# Patient Record
Sex: MALE | Race: NATIVE HAWAIIAN OR OTHER PACIFIC ISLANDER | NOT HISPANIC OR LATINO | Employment: FULL TIME | URBAN - METROPOLITAN AREA
[De-identification: names, ages, dates, MRNs, and addresses within clinical notes are randomized per-mention and may not be internally consistent; named-entity substitution may affect disease eponyms.]

---

## 2018-11-06 ENCOUNTER — OFFICE VISIT (OUTPATIENT)
Dept: URGENT CARE | Facility: PHYSICIAN GROUP | Age: 43
End: 2018-11-06
Payer: COMMERCIAL

## 2018-11-06 VITALS
HEIGHT: 67 IN | TEMPERATURE: 98.6 F | DIASTOLIC BLOOD PRESSURE: 76 MMHG | OXYGEN SATURATION: 98 % | RESPIRATION RATE: 18 BRPM | SYSTOLIC BLOOD PRESSURE: 128 MMHG | WEIGHT: 199 LBS | BODY MASS INDEX: 31.23 KG/M2 | HEART RATE: 74 BPM

## 2018-11-06 DIAGNOSIS — R42 DIZZINESS: ICD-10-CM

## 2018-11-06 DIAGNOSIS — M79.10 MYALGIA: ICD-10-CM

## 2018-11-06 DIAGNOSIS — J02.0 STREP THROAT: Primary | ICD-10-CM

## 2018-11-06 LAB
FLUAV+FLUBV AG SPEC QL IA: NORMAL
INT CON NEG: NEGATIVE
INT CON NEG: NEGATIVE
INT CON POS: POSITIVE
INT CON POS: POSITIVE
S PYO AG THROAT QL: NORMAL

## 2018-11-06 PROCEDURE — 87804 INFLUENZA ASSAY W/OPTIC: CPT | Performed by: PHYSICIAN ASSISTANT

## 2018-11-06 PROCEDURE — 87880 STREP A ASSAY W/OPTIC: CPT | Performed by: PHYSICIAN ASSISTANT

## 2018-11-06 PROCEDURE — 99204 OFFICE O/P NEW MOD 45 MIN: CPT | Performed by: PHYSICIAN ASSISTANT

## 2018-11-06 RX ORDER — ASPIRIN 81 MG/1
81 TABLET, CHEWABLE ORAL DAILY
COMMUNITY

## 2018-11-06 RX ORDER — AMOXICILLIN 875 MG/1
875 TABLET, COATED ORAL 2 TIMES DAILY
Qty: 20 TAB | Refills: 0 | Status: SHIPPED | OUTPATIENT
Start: 2018-11-06

## 2018-11-06 NOTE — LETTER
November 6, 2018         Patient: Abdulaziz Shaffer   YOB: 1975   Date of Visit: 11/6/2018           To Whom it May Concern:    Abdulaziz Shaffer was seen in my clinic on 11/6/2018. He may return to work on 11/08/2018.     If you have any questions or concerns, please don't hesitate to call.        Sincerely,           Tamara Carrero P.A.-C.  Electronically Signed

## 2018-11-07 NOTE — PATIENT INSTRUCTIONS
Strep Throat  Strep throat is a bacterial infection of the throat. Your health care provider may call the infection tonsillitis or pharyngitis, depending on whether there is swelling in the tonsils or at the back of the throat. Strep throat is most common during the cold months of the year in children who are 5-15 years of age, but it can happen during any season in people of any age. This infection is spread from person to person (contagious) through coughing, sneezing, or close contact.  What are the causes?  Strep throat is caused by the bacteria called Streptococcus pyogenes.  What increases the risk?  This condition is more likely to develop in:  · People who spend time in crowded places where the infection can spread easily.  · People who have close contact with someone who has strep throat.  What are the signs or symptoms?  Symptoms of this condition include:  · Fever or chills.  · Redness, swelling, or pain in the tonsils or throat.  · Pain or difficulty when swallowing.  · White or yellow spots on the tonsils or throat.  · Swollen, tender glands in the neck or under the jaw.  · Red rash all over the body (rare).  How is this diagnosed?  This condition is diagnosed by performing a rapid strep test or by taking a swab of your throat (throat culture test). Results from a rapid strep test are usually ready in a few minutes, but throat culture test results are available after one or two days.  How is this treated?  This condition is treated with antibiotic medicine.  Follow these instructions at home:  Medicines  · Take over-the-counter and prescription medicines only as told by your health care provider.  · Take your antibiotic as told by your health care provider. Do not stop taking the antibiotic even if you start to feel better.  · Have family members who also have a sore throat or fever tested for strep throat. They may need antibiotics if they have the strep infection.  Eating and drinking  · Do not share  food, drinking cups, or personal items that could cause the infection to spread to other people.  · If swallowing is difficult, try eating soft foods until your sore throat feels better.  · Drink enough fluid to keep your urine clear or pale yellow.  General instructions  · Gargle with a salt-water mixture 3-4 times per day or as needed. To make a salt-water mixture, completely dissolve ½-1 tsp of salt in 1 cup of warm water.  · Make sure that all household members wash their hands well.  · Get plenty of rest.  · Stay home from school or work until you have been taking antibiotics for 24 hours.  · Keep all follow-up visits as told by your health care provider. This is important.  Contact a health care provider if:  · The glands in your neck continue to get bigger.  · You develop a rash, cough, or earache.  · You cough up a thick liquid that is green, yellow-brown, or bloody.  · You have pain or discomfort that does not get better with medicine.  · Your problems seem to be getting worse rather than better.  · You have a fever.  Get help right away if:  · You have new symptoms, such as vomiting, severe headache, stiff or painful neck, chest pain, or shortness of breath.  · You have severe throat pain, drooling, or changes in your voice.  · You have swelling of the neck, or the skin on the neck becomes red and tender.  · You have signs of dehydration, such as fatigue, dry mouth, and decreased urination.  · You become increasingly sleepy, or you cannot wake up completely.  · Your joints become red or painful.  This information is not intended to replace advice given to you by your health care provider. Make sure you discuss any questions you have with your health care provider.  Document Released: 12/15/2001 Document Revised: 08/16/2017 Document Reviewed: 04/11/2016  [employer] Patient Education © 2017 [employer]

## 2018-11-07 NOTE — PROGRESS NOTES
"Subjective:      Abdulaziz Shaffer is a 43 y.o. male who presents with Dizziness (arms and legs felt weak X 4 pm today )    PMH: Reviewed with patient/family member/EPIC.   MEDS:   Current Outpatient Prescriptions:   •  aspirin (ASA) 81 MG Chew Tab chewable tablet, Take 81 mg by mouth every day., Disp: , Rfl:   •  amoxicillin (AMOXIL) 875 MG tablet, Take 1 Tab by mouth 2 times a day., Disp: 20 Tab, Rfl: 0  ALLERGIES: No Known Allergies  SURGHX: No past surgical history on file.  SOCHX:  reports that he has never smoked. He has never used smokeless tobacco.  FH: Reviewed with patient/family. Not pertinent to this complaint.            Patient presents with:  Fatigue and Dizziness: arms and legs felt weak heavy X 4 pm today , fever, chills, and sore throat, ha since yesterday.  Pt has not taken any otc medications for symptoms..           Pharyngitis    This is a new problem. The current episode started yesterday. The problem has been gradually worsening. Neither side of throat is experiencing more pain than the other. The maximum temperature recorded prior to his arrival was 100.4 - 100.9 F. The fever has been present for 1 to 2 days. The pain is at a severity of 5/10. Associated symptoms include headaches and swollen glands. Pertinent negatives include no abdominal pain, coughing, neck pain or trouble swallowing. He has tried nothing for the symptoms. The treatment provided no relief.       Review of Systems   Constitutional: Positive for chills, fever and malaise/fatigue.   HENT: Positive for sore throat. Negative for trouble swallowing.    Respiratory: Negative for cough.    Gastrointestinal: Negative for abdominal pain.   Musculoskeletal: Positive for myalgias. Negative for neck pain.   Neurological: Positive for dizziness and headaches.   All other systems reviewed and are negative.         Objective:     /76   Pulse 74   Temp 37 °C (98.6 °F)   Resp 18   Ht 1.702 m (5' 7\")   Wt 90.3 kg (199 lb)   SpO2 " 98%   BMI 31.17 kg/m²      Physical Exam   Constitutional: He is oriented to person, place, and time. He appears well-developed and well-nourished. No distress.   HENT:   Head: Normocephalic.   Right Ear: Tympanic membrane normal.   Left Ear: Tympanic membrane normal.   Nose: Nose normal.   Mouth/Throat: Uvula is midline and mucous membranes are normal. Posterior oropharyngeal edema and posterior oropharyngeal erythema present. No tonsillar abscesses. Tonsils are 3+ on the right. Tonsils are 3+ on the left. No tonsillar exudate.   Eyes: Pupils are equal, round, and reactive to light. Conjunctivae and EOM are normal.   Neck: Normal range of motion. Neck supple.   Cardiovascular: Normal rate, regular rhythm and normal heart sounds.    Pulmonary/Chest: Effort normal and breath sounds normal.   Abdominal: Soft.   Musculoskeletal: Normal range of motion.   Lymphadenopathy:     He has cervical adenopathy.   Neurological: He is alert and oriented to person, place, and time.   Skin: Skin is warm and dry. Capillary refill takes less than 2 seconds.   Psychiatric: He has a normal mood and affect.   Nursing note and vitals reviewed.         Strep:positive     Assessment/Plan:     1. Strep throat  amoxicillin (AMOXIL) 875 MG tablet   2. Dizziness  POCT Rapid Strep A    POCT Influenza A/B    amoxicillin (AMOXIL) 875 MG tablet   3. Myalgia  POCT Rapid Strep A    POCT Influenza A/B    amoxicillin (AMOXIL) 875 MG tablet     Motrin/Advil/Ibuprophen 600 mg every 6 hours as needed for pain or fever.    PT advised saltwater gargles/swishes  3-4 times daily until symptoms improve.     PT should follow up with PCP in 1-2 days for re-evaluation if symptoms have not improved.  Discussed red flags and reasons to return to UC or ED.  Pt and/or family verbalized understanding of diagnosis and follow up instructions and was offered informational handout on diagnosis.  PT discharged.

## 2018-11-10 ASSESSMENT — ENCOUNTER SYMPTOMS
DIZZINESS: 1
SWOLLEN GLANDS: 1
CHILLS: 1
HEADACHES: 1
NECK PAIN: 0
ABDOMINAL PAIN: 0
TROUBLE SWALLOWING: 0
MYALGIAS: 1
COUGH: 0
FEVER: 1
SORE THROAT: 1

## 2019-05-25 ENCOUNTER — HOSPITAL ENCOUNTER (OUTPATIENT)
Dept: LAB | Facility: MEDICAL CENTER | Age: 44
End: 2019-05-25
Attending: FAMILY MEDICINE
Payer: COMMERCIAL

## 2019-05-25 LAB
ALBUMIN SERPL BCP-MCNC: 4.4 G/DL (ref 3.2–4.9)
ALBUMIN/GLOB SERPL: 1.5 G/DL
ALP SERPL-CCNC: 53 U/L (ref 30–99)
ALT SERPL-CCNC: 33 U/L (ref 2–50)
ANION GAP SERPL CALC-SCNC: 7 MMOL/L (ref 0–11.9)
AST SERPL-CCNC: 27 U/L (ref 12–45)
BASOPHILS # BLD AUTO: 0.7 % (ref 0–1.8)
BASOPHILS # BLD: 0.04 K/UL (ref 0–0.12)
BILIRUB SERPL-MCNC: 0.6 MG/DL (ref 0.1–1.5)
BUN SERPL-MCNC: 12 MG/DL (ref 8–22)
CALCIUM SERPL-MCNC: 9.1 MG/DL (ref 8.5–10.5)
CHLORIDE SERPL-SCNC: 104 MMOL/L (ref 96–112)
CHOLEST SERPL-MCNC: 212 MG/DL (ref 100–199)
CO2 SERPL-SCNC: 25 MMOL/L (ref 20–33)
CREAT SERPL-MCNC: 0.77 MG/DL (ref 0.5–1.4)
EOSINOPHIL # BLD AUTO: 0.11 K/UL (ref 0–0.51)
EOSINOPHIL NFR BLD: 1.9 % (ref 0–6.9)
ERYTHROCYTE [DISTWIDTH] IN BLOOD BY AUTOMATED COUNT: 39.1 FL (ref 35.9–50)
FASTING STATUS PATIENT QL REPORTED: NORMAL
GLOBULIN SER CALC-MCNC: 2.9 G/DL (ref 1.9–3.5)
GLUCOSE SERPL-MCNC: 98 MG/DL (ref 65–99)
HCT VFR BLD AUTO: 48.2 % (ref 42–52)
HDLC SERPL-MCNC: 46 MG/DL
HGB BLD-MCNC: 16 G/DL (ref 14–18)
IMM GRANULOCYTES # BLD AUTO: 0.01 K/UL (ref 0–0.11)
IMM GRANULOCYTES NFR BLD AUTO: 0.2 % (ref 0–0.9)
LDLC SERPL CALC-MCNC: 151 MG/DL
LYMPHOCYTES # BLD AUTO: 1.6 K/UL (ref 1–4.8)
LYMPHOCYTES NFR BLD: 27.9 % (ref 22–41)
MCH RBC QN AUTO: 27.4 PG (ref 27–33)
MCHC RBC AUTO-ENTMCNC: 33.2 G/DL (ref 33.7–35.3)
MCV RBC AUTO: 82.5 FL (ref 81.4–97.8)
MONOCYTES # BLD AUTO: 0.48 K/UL (ref 0–0.85)
MONOCYTES NFR BLD AUTO: 8.4 % (ref 0–13.4)
NEUTROPHILS # BLD AUTO: 3.49 K/UL (ref 1.82–7.42)
NEUTROPHILS NFR BLD: 60.9 % (ref 44–72)
NRBC # BLD AUTO: 0 K/UL
NRBC BLD-RTO: 0 /100 WBC
PLATELET # BLD AUTO: 322 K/UL (ref 164–446)
PMV BLD AUTO: 9.8 FL (ref 9–12.9)
POTASSIUM SERPL-SCNC: 4.1 MMOL/L (ref 3.6–5.5)
PROT SERPL-MCNC: 7.3 G/DL (ref 6–8.2)
RBC # BLD AUTO: 5.84 M/UL (ref 4.7–6.1)
SODIUM SERPL-SCNC: 136 MMOL/L (ref 135–145)
TRIGL SERPL-MCNC: 76 MG/DL (ref 0–149)
WBC # BLD AUTO: 5.7 K/UL (ref 4.8–10.8)

## 2019-05-25 PROCEDURE — 80053 COMPREHEN METABOLIC PANEL: CPT

## 2019-05-25 PROCEDURE — 80061 LIPID PANEL: CPT

## 2019-05-25 PROCEDURE — 85025 COMPLETE CBC W/AUTO DIFF WBC: CPT

## 2019-05-25 PROCEDURE — 36415 COLL VENOUS BLD VENIPUNCTURE: CPT

## 2019-08-31 ENCOUNTER — HOSPITAL ENCOUNTER (OUTPATIENT)
Dept: LAB | Facility: MEDICAL CENTER | Age: 44
End: 2019-08-31
Attending: FAMILY MEDICINE
Payer: COMMERCIAL

## 2019-08-31 LAB
ALBUMIN SERPL BCP-MCNC: 4.5 G/DL (ref 3.2–4.9)
ALBUMIN/GLOB SERPL: 1.6 G/DL
ALP SERPL-CCNC: 55 U/L (ref 30–99)
ALT SERPL-CCNC: 30 U/L (ref 2–50)
ANION GAP SERPL CALC-SCNC: 7 MMOL/L (ref 0–11.9)
AST SERPL-CCNC: 20 U/L (ref 12–45)
BILIRUB SERPL-MCNC: 0.6 MG/DL (ref 0.1–1.5)
BUN SERPL-MCNC: 11 MG/DL (ref 8–22)
CALCIUM SERPL-MCNC: 9.4 MG/DL (ref 8.5–10.5)
CHLORIDE SERPL-SCNC: 103 MMOL/L (ref 96–112)
CHOLEST SERPL-MCNC: 186 MG/DL (ref 100–199)
CO2 SERPL-SCNC: 27 MMOL/L (ref 20–33)
CREAT SERPL-MCNC: 0.88 MG/DL (ref 0.5–1.4)
FASTING STATUS PATIENT QL REPORTED: NORMAL
GLOBULIN SER CALC-MCNC: 2.9 G/DL (ref 1.9–3.5)
GLUCOSE SERPL-MCNC: 99 MG/DL (ref 65–99)
HDLC SERPL-MCNC: 42 MG/DL
LDLC SERPL CALC-MCNC: 117 MG/DL
POTASSIUM SERPL-SCNC: 4 MMOL/L (ref 3.6–5.5)
PROT SERPL-MCNC: 7.4 G/DL (ref 6–8.2)
SODIUM SERPL-SCNC: 137 MMOL/L (ref 135–145)
TRIGL SERPL-MCNC: 137 MG/DL (ref 0–149)

## 2019-08-31 PROCEDURE — 36415 COLL VENOUS BLD VENIPUNCTURE: CPT

## 2019-08-31 PROCEDURE — 80053 COMPREHEN METABOLIC PANEL: CPT

## 2019-08-31 PROCEDURE — 80061 LIPID PANEL: CPT

## 2019-10-21 ENCOUNTER — OFFICE VISIT (OUTPATIENT)
Dept: URGENT CARE | Facility: PHYSICIAN GROUP | Age: 44
End: 2019-10-21
Payer: COMMERCIAL

## 2019-10-21 VITALS
BODY MASS INDEX: 31.39 KG/M2 | TEMPERATURE: 97.2 F | OXYGEN SATURATION: 97 % | SYSTOLIC BLOOD PRESSURE: 128 MMHG | DIASTOLIC BLOOD PRESSURE: 72 MMHG | RESPIRATION RATE: 16 BRPM | HEIGHT: 67 IN | WEIGHT: 200 LBS | HEART RATE: 106 BPM

## 2019-10-21 DIAGNOSIS — R68.83 CHILLS (WITHOUT FEVER): ICD-10-CM

## 2019-10-21 DIAGNOSIS — M79.10 MYALGIA: ICD-10-CM

## 2019-10-21 DIAGNOSIS — R42 DIZZINESS: ICD-10-CM

## 2019-10-21 LAB
APPEARANCE UR: NORMAL
BILIRUB UR STRIP-MCNC: NEGATIVE MG/DL
COLOR UR AUTO: NORMAL
FLUAV+FLUBV AG SPEC QL IA: NEGATIVE
GLUCOSE UR STRIP.AUTO-MCNC: NEGATIVE MG/DL
INT CON NEG: NEGATIVE
INT CON NEG: NEGATIVE
INT CON POS: POSITIVE
INT CON POS: POSITIVE
KETONES UR STRIP.AUTO-MCNC: NEGATIVE MG/DL
LEUKOCYTE ESTERASE UR QL STRIP.AUTO: NEGATIVE
NITRITE UR QL STRIP.AUTO: NEGATIVE
PH UR STRIP.AUTO: 5.5 [PH] (ref 5–8)
PROT UR QL STRIP: NEGATIVE MG/DL
RBC UR QL AUTO: NEGATIVE
S PYO AG THROAT QL: NEGATIVE
SP GR UR STRIP.AUTO: 1.03
UROBILINOGEN UR STRIP-MCNC: 0.2 MG/DL

## 2019-10-21 PROCEDURE — 81002 URINALYSIS NONAUTO W/O SCOPE: CPT | Performed by: NURSE PRACTITIONER

## 2019-10-21 PROCEDURE — 87804 INFLUENZA ASSAY W/OPTIC: CPT | Performed by: NURSE PRACTITIONER

## 2019-10-21 PROCEDURE — 87880 STREP A ASSAY W/OPTIC: CPT | Performed by: NURSE PRACTITIONER

## 2019-10-21 PROCEDURE — 99214 OFFICE O/P EST MOD 30 MIN: CPT | Performed by: NURSE PRACTITIONER

## 2019-10-21 ASSESSMENT — ENCOUNTER SYMPTOMS
DIARRHEA: 0
NAUSEA: 1
NECK PAIN: 0
DIZZINESS: 1
CHILLS: 0
SORE THROAT: 0
VOMITING: 0
SHORTNESS OF BREATH: 0
BACK PAIN: 0
SPUTUM PRODUCTION: 0
SINUS PAIN: 0
HEARTBURN: 0
WHEEZING: 0
COUGH: 0
FEVER: 0
HEADACHES: 0

## 2019-10-21 NOTE — PROGRESS NOTES
Subjective:      Abdulaziz Shaffer is a 44 y.o. male who presents with Dizziness (weakness, body aches, X today )            HPI   This is a new problem.  Patient states that after he ate his lunch today he started feeling nauseated, dizzy, achy and weak in his upper body as well as having muscle pain in his upper shoulders that came on suddenly and is constant.  Patient states that the last time he felt this way he tested positive for strep throat.  Patient admits to chills and subjective fever, but denies nasal congestion, runny nose, cough, sore throat, diarrhea, heartburn, or headache.  Patient is not taking anything over-the-counter for symptoms.    Review of Systems   Constitutional: Negative for chills and fever.   HENT: Negative for congestion, ear pain, sinus pain and sore throat.    Respiratory: Negative for cough, sputum production, shortness of breath and wheezing.    Cardiovascular: Negative for chest pain.   Gastrointestinal: Positive for nausea. Negative for diarrhea, heartburn and vomiting.   Genitourinary: Negative for frequency and urgency.   Musculoskeletal: Negative for back pain and neck pain.   Neurological: Positive for dizziness. Negative for headaches.     History reviewed. No pertinent past medical history. History reviewed. No pertinent surgical history.   Social History     Socioeconomic History   • Marital status: Single     Spouse name: Not on file   • Number of children: Not on file   • Years of education: Not on file   • Highest education level: Not on file   Occupational History   • Not on file   Social Needs   • Financial resource strain: Not on file   • Food insecurity:     Worry: Not on file     Inability: Not on file   • Transportation needs:     Medical: Not on file     Non-medical: Not on file   Tobacco Use   • Smoking status: Never Smoker   • Smokeless tobacco: Never Used   Substance and Sexual Activity   • Alcohol use: Not on file   • Drug use: Not on file   • Sexual activity:  "Not on file   Lifestyle   • Physical activity:     Days per week: Not on file     Minutes per session: Not on file   • Stress: Not on file   Relationships   • Social connections:     Talks on phone: Not on file     Gets together: Not on file     Attends Latter day service: Not on file     Active member of club or organization: Not on file     Attends meetings of clubs or organizations: Not on file     Relationship status: Not on file   • Intimate partner violence:     Fear of current or ex partner: Not on file     Emotionally abused: Not on file     Physically abused: Not on file     Forced sexual activity: Not on file   Other Topics Concern   • Not on file   Social History Narrative   • Not on file    Patient has no known allergies.    Lab Results   Component Value Date/Time    POCCOLOR dark yellow 10/21/2019 02:25 PM    POCAPPEAR slightly cloudy 10/21/2019 02:25 PM    POCLEUKEST negative 10/21/2019 02:25 PM    POCNITRITE negative 10/21/2019 02:25 PM    POCUROBILIGE 0.2 10/21/2019 02:25 PM    POCPROTEIN negative 10/21/2019 02:25 PM    POCURPH 5.5 10/21/2019 02:25 PM    POCBLOOD negative 10/21/2019 02:25 PM    POCSPGRV 1.030 10/21/2019 02:25 PM    POCKETONES negative 10/21/2019 02:25 PM    POCBILIRUBIN negative 10/21/2019 02:25 PM    POCGLUCUA negative 10/21/2019 02:25 PM         Objective:     /72   Pulse (!) 106   Temp 36.2 °C (97.2 °F)   Resp 16   Ht 1.702 m (5' 7\")   Wt 90.7 kg (200 lb)   SpO2 97%   BMI 31.32 kg/m²      Physical Exam   Constitutional: He is oriented to person, place, and time. He appears well-developed and well-nourished.   HENT:   Head: Normocephalic and atraumatic.   Right Ear: Hearing, tympanic membrane, external ear and ear canal normal.   Left Ear: Hearing, tympanic membrane, external ear and ear canal normal.   Nose: Rhinorrhea present.   Mouth/Throat: Uvula is midline, oropharynx is clear and moist and mucous membranes are normal.   Eyes: Pupils are equal, round, and reactive " to light.   Neck: Normal range of motion. Neck supple.   Cardiovascular: Normal rate, regular rhythm and normal heart sounds.   Pulmonary/Chest: Effort normal and breath sounds normal.   Abdominal: Soft. Bowel sounds are normal. He exhibits no distension. There is tenderness. There is no guarding.   Neurological: He is alert and oriented to person, place, and time.   Skin: Skin is warm and dry.   Psychiatric: He has a normal mood and affect. His behavior is normal. Judgment and thought content normal.                 Assessment/Plan:     1. Dizziness    - POCT Rapid Strep A - negative  - EKG - EKG: NSR rate:79, borderline right axis deviation, normal intervals, no evidence of ischemia or hypertrophy.  - POCT Urinalysis  - POCT Influenza A/B - negative    2. Myalgia  - POCT Influenza A/B    3. Chills (without fever)  - POCT Rapid Strep A  - EKG  - POCT Urinalysis    4. Viral respiratory Illness    Explained to patient that all testing today in the urgent care was negative and that coupled with physical exam indicates possible viral upper respiratory infection.  Suggested patient take over-the-counter NSAIDs or Tylenol for muscle pain, increase fluids, get rest and eat a bland diet for his nausea.    Patient advised to return to clinic if symptoms persist past 7 to 10 days or sooner for worsening symptoms.     Supportive care, differential diagnoses, and indications for immediate follow-up in ER discussed with patient.    Pathogenesis of diagnosis discussed including typical length and natural progression of illness. Patient expresses understanding and agrees to plan.

## 2019-10-21 NOTE — LETTER
October 21, 2019         Patient: Abdulaziz Shaffer   YOB: 1975   Date of Visit: 10/21/2019           To Whom it May Concern:    Abdulaziz Shaffer was seen in my clinic on 10/21/2019. He may return to work 10/22/2019.    If you have any questions or concerns, please don't hesitate to call.        Sincerely,           HONORIO Xiao.  Electronically Signed

## 2020-01-04 ENCOUNTER — HOSPITAL ENCOUNTER (OUTPATIENT)
Dept: LAB | Facility: MEDICAL CENTER | Age: 45
End: 2020-01-04
Attending: FAMILY MEDICINE
Payer: COMMERCIAL

## 2020-01-04 LAB
ALBUMIN SERPL BCP-MCNC: 4.3 G/DL (ref 3.2–4.9)
ALBUMIN/GLOB SERPL: 1.3 G/DL
ALP SERPL-CCNC: 51 U/L (ref 30–99)
ALT SERPL-CCNC: 45 U/L (ref 2–50)
ANION GAP SERPL CALC-SCNC: 8 MMOL/L (ref 0–11.9)
AST SERPL-CCNC: 25 U/L (ref 12–45)
BILIRUB SERPL-MCNC: 0.6 MG/DL (ref 0.1–1.5)
BUN SERPL-MCNC: 13 MG/DL (ref 8–22)
CALCIUM SERPL-MCNC: 9.1 MG/DL (ref 8.5–10.5)
CHLORIDE SERPL-SCNC: 104 MMOL/L (ref 96–112)
CHOLEST SERPL-MCNC: 198 MG/DL (ref 100–199)
CO2 SERPL-SCNC: 27 MMOL/L (ref 20–33)
CREAT SERPL-MCNC: 0.88 MG/DL (ref 0.5–1.4)
FASTING STATUS PATIENT QL REPORTED: NORMAL
GLOBULIN SER CALC-MCNC: 3.3 G/DL (ref 1.9–3.5)
GLUCOSE SERPL-MCNC: 105 MG/DL (ref 65–99)
HDLC SERPL-MCNC: 56 MG/DL
LDLC SERPL CALC-MCNC: 119 MG/DL
POTASSIUM SERPL-SCNC: 4.1 MMOL/L (ref 3.6–5.5)
PROT SERPL-MCNC: 7.6 G/DL (ref 6–8.2)
SODIUM SERPL-SCNC: 139 MMOL/L (ref 135–145)
TRIGL SERPL-MCNC: 114 MG/DL (ref 0–149)

## 2020-01-04 PROCEDURE — 80053 COMPREHEN METABOLIC PANEL: CPT

## 2020-01-04 PROCEDURE — 36415 COLL VENOUS BLD VENIPUNCTURE: CPT

## 2020-01-04 PROCEDURE — 80061 LIPID PANEL: CPT

## 2024-11-12 ENCOUNTER — OFFICE VISIT (OUTPATIENT)
Dept: INTERNAL MEDICINE | Facility: OTHER | Age: 49
End: 2024-11-12
Payer: COMMERCIAL

## 2024-11-12 VITALS
HEART RATE: 62 BPM | HEIGHT: 66 IN | WEIGHT: 192.8 LBS | SYSTOLIC BLOOD PRESSURE: 124 MMHG | OXYGEN SATURATION: 97 % | TEMPERATURE: 97.3 F | BODY MASS INDEX: 30.98 KG/M2 | DIASTOLIC BLOOD PRESSURE: 77 MMHG

## 2024-11-12 DIAGNOSIS — Z11.3 SCREEN FOR STD (SEXUALLY TRANSMITTED DISEASE): ICD-10-CM

## 2024-11-12 DIAGNOSIS — I25.10 CAD S/P PERCUTANEOUS CORONARY ANGIOPLASTY: ICD-10-CM

## 2024-11-12 DIAGNOSIS — Z12.11 SCREENING FOR COLORECTAL CANCER: ICD-10-CM

## 2024-11-12 DIAGNOSIS — Z91.89 AT RISK FOR SLEEP APNEA: ICD-10-CM

## 2024-11-12 DIAGNOSIS — E66.09 CLASS 1 OBESITY DUE TO EXCESS CALORIES WITH SERIOUS COMORBIDITY AND BODY MASS INDEX (BMI) OF 30.0 TO 30.9 IN ADULT: ICD-10-CM

## 2024-11-12 DIAGNOSIS — E66.9 OBESITY (BMI 30-39.9): ICD-10-CM

## 2024-11-12 DIAGNOSIS — Z98.61 CAD S/P PERCUTANEOUS CORONARY ANGIOPLASTY: ICD-10-CM

## 2024-11-12 DIAGNOSIS — E66.811 CLASS 1 OBESITY DUE TO EXCESS CALORIES WITH SERIOUS COMORBIDITY AND BODY MASS INDEX (BMI) OF 30.0 TO 30.9 IN ADULT: ICD-10-CM

## 2024-11-12 DIAGNOSIS — E11.9 TYPE 2 DIABETES MELLITUS WITHOUT COMPLICATION, WITHOUT LONG-TERM CURRENT USE OF INSULIN (HCC): ICD-10-CM

## 2024-11-12 DIAGNOSIS — Z12.12 SCREENING FOR COLORECTAL CANCER: ICD-10-CM

## 2024-11-12 DIAGNOSIS — Z23 NEED FOR VACCINATION: ICD-10-CM

## 2024-11-12 LAB
HBA1C MFR BLD: 6.3 % (ref ?–5.8)
POCT INT CON NEG: NEGATIVE
POCT INT CON POS: POSITIVE

## 2024-11-12 PROCEDURE — 96372 THER/PROPH/DIAG INJ SC/IM: CPT | Mod: GC | Performed by: INTERNAL MEDICINE

## 2024-11-12 PROCEDURE — 3078F DIAST BP <80 MM HG: CPT | Mod: GC | Performed by: INTERNAL MEDICINE

## 2024-11-12 PROCEDURE — 99204 OFFICE O/P NEW MOD 45 MIN: CPT | Mod: 25,GC | Performed by: INTERNAL MEDICINE

## 2024-11-12 PROCEDURE — 3074F SYST BP LT 130 MM HG: CPT | Mod: GC | Performed by: INTERNAL MEDICINE

## 2024-11-12 PROCEDURE — 90656 IIV3 VACC NO PRSV 0.5 ML IM: CPT | Performed by: INTERNAL MEDICINE

## 2024-11-12 PROCEDURE — 90471 IMMUNIZATION ADMIN: CPT | Performed by: INTERNAL MEDICINE

## 2024-11-12 PROCEDURE — 83036 HEMOGLOBIN GLYCOSYLATED A1C: CPT | Mod: GC

## 2024-11-12 RX ORDER — LISINOPRIL 5 MG/1
2.5 TABLET ORAL DAILY
COMMUNITY
Start: 2024-11-04

## 2024-11-12 RX ORDER — CLOPIDOGREL BISULFATE 75 MG
75 TABLET ORAL DAILY
COMMUNITY
Start: 2024-11-04

## 2024-11-12 RX ORDER — ATORVASTATIN CALCIUM 40 MG/1
80 TABLET, FILM COATED ORAL NIGHTLY
COMMUNITY
End: 2024-11-12 | Stop reason: SDUPTHER

## 2024-11-12 RX ORDER — EMPAGLIFLOZIN 10 MG/1
10 TABLET, FILM COATED ORAL DAILY
COMMUNITY
Start: 2024-11-04

## 2024-11-12 RX ORDER — ATORVASTATIN CALCIUM 80 MG/1
80 TABLET, FILM COATED ORAL NIGHTLY
Qty: 100 TABLET | Refills: 2 | Status: SHIPPED | OUTPATIENT
Start: 2024-11-12

## 2024-11-12 RX ORDER — METOPROLOL SUCCINATE 25 MG
25 TABLET, EXTENDED RELEASE 24 HR ORAL DAILY
COMMUNITY
Start: 2024-11-04

## 2024-11-12 RX ORDER — NITROGLYCERIN 0.4 MG/1
0.4 TABLET SUBLINGUAL PRN
COMMUNITY
Start: 2024-11-04

## 2024-11-12 ASSESSMENT — PATIENT HEALTH QUESTIONNAIRE - PHQ9: CLINICAL INTERPRETATION OF PHQ2 SCORE: 0

## 2024-11-12 NOTE — PROGRESS NOTES
Office Visit Initial Encounter    Chief Complaint   Patient presents with    New Patient     Establish care.  Patient had a heart attack while in [place] and was advised to establish with a pcp       HPI   Mr. Shaffer is a 49 yoM with MH of hyperlipidemia, hypertension, prediabetes, obesity (all recently diagnosed), and a recent hospitalization for anterior STEMI s/p stenting of LAD with 2 overlapping GORDO. He was traveling in [facility] from [facility]. He had no complications and has been doing well since discharge on 11/09, denies CP/SOB/orthopnea/PND/lightheadedness/palpitations/LE swelling. Has been watching his diet more. Prior to that hospitalization, he had not been diagnosed with chronic conditions and was not taking any medications. Has not seen cardiology, needs a referral. He does not drink/smoke. Has been compliant with medications.     Past Medical History  No past medical history on file.    Allergies:     Patient has no known allergies.    Medications    Current Outpatient Medications:     Jardiance, 10 mg, Oral, DAILY, Taking    lisinopril, 2.5 mg, Oral, DAILY, Taking    Plavix, 75 mg, Oral, DAILY, Taking    Toprol XL, 25 mg, Oral, DAILY, Taking    nitroglycerin, 0.4 mg, Sublingual, PRN, Taking    atorvastatin, 80 mg, Oral, Nightly, Taking    aspirin, 81 mg, Oral, DAILY, Taking    Family History  Family History   Problem Relation Age of Onset    Valvular heart disease Mother     Heart Attack Father 50       Surgical History  No past surgical history on file.    Social History   Social History     Tobacco Use    Smoking status: Never    Smokeless tobacco: Never   Vaping Use    Vaping status: Never Used   Substance Use Topics    Alcohol use: Never    Drug use: Never       ROS     General: No fevers, chills, night sweats, weight loss or gain.  H&N: No hearing changes, vision changes, eye pain, ear pain, nasal discharge, sore throat, no neck swelling.  Pulmonary: No shortness of  "breath, cough, sputum, or hemoptysis.  GI: No nausea, vomiting, diarrhea, constipation, abdominal pain, hematochezia or melena.  : No dysuria, frequency, retention or hematuria.   Neuro: No headaches, no lightheadedness, no dizziness. No focal weakness, no general weakness. No gait disturbances.   Psych: No anxiety or depression.     Physical Exam     /77 (BP Location: Left arm, Patient Position: Sitting, BP Cuff Size: Adult)   Pulse 62   Temp 36.3 °C (97.3 °F) (Temporal)   Ht 1.689 m (5' 6.5\")   Wt 87.5 kg (192 lb 12.8 oz)   SpO2 97%   BMI 30.66 kg/m²     General: No acute distress, comfortable.   Head and Neck: NC/AT, EOMI, no scleral icterus or conjunctival pallor, no nasal discharge or oral erythema or exudates. Neck supple, no LADs.   CV: Rhythmic heart sounds, no murmurs, gallops or rubs. No S3,S4 or JVD. Dorsalis pedis/posterior tibial pulses present, symmetrical.   Pulm: Chest expansion is symmetrical, no crackles, rales, rhonchi, or wheezing.  GI: Flat, non distended, soft, non tender, normal bowel sounds.  Skin: Warm, no rashes, no lesions.  MSK: Normal ROM. No lower extremity edema. No lesions.   Neuro: Patient is alert and oriented x3. Speech is clear and fluent, goal directed. Is able to name, repeat and comprehend. Pupils equal, round and reactive to light. Good eye contact. Extra ocular movements intact. Facial and body symmetry without obvious focal deficit.   Psych: Appropriate mood and affect.     Diagnostic tests  Echo reported in  [facility]: An echocardiogram performed revealed apical hypokinesis with preserved global left ventricular systolic function.     I have reviewed all pertinent labs and diagnostic tests associated with this visit with specific comments listed under the assessment and plan below    Assessment and Plan    CAD s/p 2 stents to LAD   Hyperlipidemia  Hypertension  Compliant with meds since discharge. Doeing well, no symptoms at rest or exertion.  BP " and heart rate at goal.  Echo reported and notes as with normal LVEF and apical hypokinesis.  -Discussed healthier lifestyle and important dietary changes - DASH handout   -Continue Plavix, aspirin, high intensity statin, metoprolol and lisinopril  -Cardiology referral  -Referral for cardiac rehab in the meantime  -Provided letter for work restrictions to avoid lifting more than 10 pounds for the next 4 to 6 weeks  -Lipid panel and CMP for next visit     Prediabetes  BMI 30  A1c in office 6.3%.  No prior history of insulin resistance.   -Started on Jardiance during hospitalization, agree with this therapy in the setting of CV disease above   -Discussed lifestyle modifications as above    At risk for sleep apnea  STOPBANG 5 points.  Potentially contributing to CV disease as above.  -Home sleep study ordered    HCM  -Flu shot given in office  -Check HIV and hep C  -Discussed colon cancer screening options, patient opts for Cologuard    Return in about 2 months (around 1/12/2025).    Lakia ADAMS PGY-3  Internal Medicine [employer]    Pt has been seen and discussed with the Attending Physician

## 2024-11-12 NOTE — LETTER
November 12, 2024    To Whom It May Concern:         This is confirmation that Abdulaziz Edmund attended his scheduled appointment with Lakia Lanza M.D. on 11/12/24. I recommend he avoids heavy lifting activities at work (no more than 10 pounds).          If you have any questions please do not hesitate to call me at the phone number listed below.    Sincerely,          Lakia Lanza M.D.  [phone]

## 2024-11-12 NOTE — PATIENT INSTRUCTIONS
Get the covid booster and shingles vaccine   Get the blood work 1-2 weeks before your next visit   You will get a call to schedule the cardiology appointment, cardiac rehabilitation and sleep study  You will receive the cologuard pack at home

## 2024-12-06 ENCOUNTER — TELEPHONE (OUTPATIENT)
Dept: HEALTH INFORMATION MANAGEMENT | Facility: OTHER | Age: 49
End: 2024-12-06
Payer: COMMERCIAL

## 2024-12-10 ENCOUNTER — OFFICE VISIT (OUTPATIENT)
Dept: CARDIOLOGY | Facility: MEDICAL CENTER | Age: 49
End: 2024-12-10
Payer: COMMERCIAL

## 2024-12-10 VITALS
OXYGEN SATURATION: 96 % | WEIGHT: 182 LBS | HEIGHT: 66 IN | RESPIRATION RATE: 16 BRPM | BODY MASS INDEX: 29.25 KG/M2 | DIASTOLIC BLOOD PRESSURE: 72 MMHG | SYSTOLIC BLOOD PRESSURE: 100 MMHG | HEART RATE: 63 BPM

## 2024-12-10 DIAGNOSIS — I10 ESSENTIAL HYPERTENSION: ICD-10-CM

## 2024-12-10 DIAGNOSIS — E78.5 DYSLIPIDEMIA: ICD-10-CM

## 2024-12-10 DIAGNOSIS — I25.10 CORONARY ARTERY DISEASE INVOLVING NATIVE CORONARY ARTERY OF NATIVE HEART WITHOUT ANGINA PECTORIS: ICD-10-CM

## 2024-12-10 DIAGNOSIS — I24.0 ISCHEMIC HEART DISEASE DUE TO CORONARY ARTERY OBSTRUCTION (HCC): ICD-10-CM

## 2024-12-10 DIAGNOSIS — I25.9 ISCHEMIC HEART DISEASE DUE TO CORONARY ARTERY OBSTRUCTION (HCC): ICD-10-CM

## 2024-12-10 DIAGNOSIS — E11.59 TYPE 2 DIABETES MELLITUS WITH OTHER CIRCULATORY COMPLICATION, WITHOUT LONG-TERM CURRENT USE OF INSULIN (HCC): ICD-10-CM

## 2024-12-10 DIAGNOSIS — I22.0: ICD-10-CM

## 2024-12-10 PROBLEM — E11.9 DIABETES MELLITUS (HCC): Status: ACTIVE | Noted: 2024-12-10

## 2024-12-10 LAB — EKG IMPRESSION: NORMAL

## 2024-12-10 PROCEDURE — 99202 OFFICE O/P NEW SF 15 MIN: CPT | Performed by: INTERNAL MEDICINE

## 2024-12-10 PROCEDURE — 93005 ELECTROCARDIOGRAM TRACING: CPT | Mod: TC | Performed by: INTERNAL MEDICINE

## 2024-12-10 PROCEDURE — 3078F DIAST BP <80 MM HG: CPT | Performed by: INTERNAL MEDICINE

## 2024-12-10 PROCEDURE — 99204 OFFICE O/P NEW MOD 45 MIN: CPT | Performed by: INTERNAL MEDICINE

## 2024-12-10 PROCEDURE — 93010 ELECTROCARDIOGRAM REPORT: CPT | Performed by: INTERNAL MEDICINE

## 2024-12-10 PROCEDURE — 3074F SYST BP LT 130 MM HG: CPT | Performed by: INTERNAL MEDICINE

## 2024-12-10 NOTE — PROGRESS NOTES
Admission Date / Service Date: 12/10/24    Patient's PCP: Lakia Lanza M.D.    I am seeing this patient in consultation at the request of Zain Smith MD for evaluation of anterior STEMI.    HPI: Mr. Shaffer is a 49-year-old male with coronary artery disease s/p anterior STEMI 2024 treated with TNK followed by PCI of the LAD with a 2.5 x 38 mm [place] GORDO to the proximal LAD and a 2.25 x 38 mm [place] GORDO to the mid to distal LAD, hypertension, newly diagnosed type 2 diabetes mellitus, dyslipidemia, and obesity who is here for evaluation of his cardiac status.    He was in [facility] last month when he developed chest pain.  He went to an emergency room and was found to have an acute anterior myocardial infarction.  He was treated with thrombolytic therapy and transferred to a PCI capable facility.  He underwent PCI of the LAD with 2 drug-eluting stents.  Unfortunately cardiac catheterization report is not available.    He has not had any further chest pain or pressure.  He has not started exercising but says he stays active at work.  He says he walks a lot at work.  He denies any shortness of breath, dyspnea on exertion, orthopnea, PND, or lower extremity edema.  No syncope or near syncope.  No palpitations.    He is monitoring his blood pressure and it is mostly 100s systolic over 70s diastolic.    He has lost over 20 pounds since his heart attack with changing his diet.    Cardiology Results:  EK/10/2024 EKG ordered and interpreted by me today shows sinus bradycardia at 55 bpm.  Right axis deviation.  Nonspecific ST-T wave abnormalities in the anterior leads.    Echo:      Cath:  2024 in [place], [place]: 2 vessel CAD.  99% prox LAD, 80% diffuse mid and distal CAD.  BLAINE 3 flow post TNK thrombolysis.  EF= 55%.  EDP= 8 mmHg.  Successful PTCA/stenting of LAD with 2 overlapping GORDO.  Distal stent: 2.25X 38 mm [place], prox overlapping GORDO 2.5X 38 mm [place].     Stress test:    Current list of  administered Medications:    Current Outpatient Medications:     JARDIANCE 10 MG Tab tablet, Take 10 mg by mouth every day., Disp: , Rfl:     lisinopril (PRINIVIL) 5 MG Tab, Take 2.5 mg by mouth every day., Disp: , Rfl:     PLAVIX 75 MG Tab, Take 75 mg by mouth every day., Disp: , Rfl:     TOPROL XL 25 MG TABLET SR 24 HR, Take 25 mg by mouth every day., Disp: , Rfl:     nitroglycerin (NITROSTAT) 0.4 MG SL Tab, Place 0.4 mg under the tongue as needed for Chest Pain., Disp: , Rfl:     atorvastatin (LIPITOR) 80 MG tablet, Take 1 Tablet by mouth every evening., Disp: 100 Tablet, Rfl: 2    aspirin (ASA) 81 MG Chew Tab chewable tablet, Take 81 mg by mouth every day., Disp: , Rfl:     No past medical history on file.    No past surgical history on file.    Family History   Problem Relation Age of Onset    Valvular heart disease Mother     Heart Attack Father 50             No Known Allergies    Review of systems:  All others systems reviewed and negative.  No fever, chills. No nausea vomiting.    Physical exam:  Vitals:    12/10/24 1345   BP: 100/72   Pulse: 63   Resp: 16   SpO2: 96%        General: No acute distress. Well nourished.  HEENT: Normocephalic.  Atraumatic.  EOM grossly intact, no scleral icterus.  Neck:  No JVD, no bruits  CVS: RRR. Normal S1, S2. No murmurs, gallops, or rubs.   Resp: CTAB. No wheezing or crackles/rhonchi. Normal respiratory effort.  Abdomen: Soft, NT, no sarkis hepatomegaly.  MSK/Ext: No clubbing or cyanosis. No edema.  Skin: Warm and dry, no rashes.  Neurological: CN III-XII grossly intact. No focal deficits.   Psych: A&O x 3, appropriate affect, good judgement  Pulses: Carotid, radial, and posterior tibial pulses are 2+ bilaterally.  Right femoral artery is 2+ with no bruit.  Right groin site is well-healed.    Data:  Laboratory studies:  Lab Results   Component Value Date/Time    WBC 5.7 05/25/2019 09:20 AM    RBC 5.84 05/25/2019 09:20 AM    HEMOGLOBIN 16.0 05/25/2019 09:20 AM    HEMATOCRIT  48.2 05/25/2019 09:20 AM    MCV 82.5 05/25/2019 09:20 AM    MCH 27.4 05/25/2019 09:20 AM    MCHC 33.2 (L) 05/25/2019 09:20 AM    MPV 9.8 05/25/2019 09:20 AM    NEUTSPOLYS 60.90 05/25/2019 09:20 AM    LYMPHOCYTES 27.90 05/25/2019 09:20 AM    MONOCYTES 8.40 05/25/2019 09:20 AM    EOSINOPHILS 1.90 05/25/2019 09:20 AM    BASOPHILS 0.70 05/25/2019 09:20 AM        Lab Results   Component Value Date/Time    SODIUM 139 01/04/2020 07:05 AM    POTASSIUM 4.1 01/04/2020 07:05 AM    CHLORIDE 104 01/04/2020 07:05 AM    CO2 27 01/04/2020 07:05 AM    GLUCOSE 105 (H) 01/04/2020 07:05 AM    BUN 13 01/04/2020 07:05 AM    CREATININE 0.88 01/04/2020 07:05 AM      No recent lipid panel available.    Laboratory data November 12, 2024 showed hemoglobin A1c of 6.3 in PCPs office.    Assessment :  1. Coronary artery disease involving native coronary artery of native heart without angina pectoris        2. Subsequent ST elevation (STEMI) myocardial infarction of anterior wall (HCC)  Referral to Cardiac Rehab      3. Essential hypertension  CBC WITHOUT DIFFERENTIAL    EKG      4. Dyslipidemia        5. Type 2 diabetes mellitus with other circulatory complication, without long-term current use of insulin (HCC)        6. Ischemic heart disease due to coronary artery obstruction (HCC)             Plan:  I will continue the patient's current medications.  I reviewed the patient's hospital records that were available.  I will try to obtain records from [facility] in [facility] including cath report and echocardiogram results.  I will refer him to cardiac rehabilitation.  We discussed use of sublingual nitroglycerin in detail.  We discussed low-salt, low-fat, diabetic diet.  We discussed exercise.  I instructed him to continue monitoring his blood pressure.  He is scheduled to have a lipid panel and CMP done in 2 weeks through his PCPs office.  I will also check a CBC at that time.  He will return to clinic in approximately 4  weeks.    Thank you for allowing me to participate in this patient's care.  Please do not hesitate to contact me with questions or concerns.    Gali Cruz MD, [facility]  Cardiologist   [facility]    Please note that this dictation was created using voice recognition software. I have made every reasonable attempt to correct obvious errors, but it is possible there are errors of grammar and possibly content that I did not discover before finalizing the note.

## 2024-12-21 ENCOUNTER — HOSPITAL ENCOUNTER (OUTPATIENT)
Dept: LAB | Facility: MEDICAL CENTER | Age: 49
End: 2024-12-21
Attending: INTERNAL MEDICINE
Payer: COMMERCIAL

## 2024-12-21 ENCOUNTER — HOSPITAL ENCOUNTER (OUTPATIENT)
Dept: LAB | Facility: MEDICAL CENTER | Age: 49
End: 2024-12-21
Payer: COMMERCIAL

## 2024-12-21 DIAGNOSIS — Z11.3 SCREEN FOR STD (SEXUALLY TRANSMITTED DISEASE): ICD-10-CM

## 2024-12-21 DIAGNOSIS — I10 ESSENTIAL HYPERTENSION: ICD-10-CM

## 2024-12-21 DIAGNOSIS — I25.10 CAD S/P PERCUTANEOUS CORONARY ANGIOPLASTY: ICD-10-CM

## 2024-12-21 DIAGNOSIS — Z98.61 CAD S/P PERCUTANEOUS CORONARY ANGIOPLASTY: ICD-10-CM

## 2024-12-21 LAB
ALBUMIN SERPL BCP-MCNC: 4.5 G/DL (ref 3.2–4.9)
ALBUMIN/GLOB SERPL: 1.6 G/DL
ALP SERPL-CCNC: 68 U/L (ref 30–99)
ALT SERPL-CCNC: 37 U/L (ref 2–50)
ANION GAP SERPL CALC-SCNC: 13 MMOL/L (ref 7–16)
AST SERPL-CCNC: 25 U/L (ref 12–45)
BILIRUB SERPL-MCNC: 0.4 MG/DL (ref 0.1–1.5)
BUN SERPL-MCNC: 12 MG/DL (ref 8–22)
CALCIUM ALBUM COR SERPL-MCNC: 8.8 MG/DL (ref 8.5–10.5)
CALCIUM SERPL-MCNC: 9.2 MG/DL (ref 8.5–10.5)
CHLORIDE SERPL-SCNC: 104 MMOL/L (ref 96–112)
CHOLEST SERPL-MCNC: 93 MG/DL (ref 100–199)
CO2 SERPL-SCNC: 23 MMOL/L (ref 20–33)
CREAT SERPL-MCNC: 0.65 MG/DL (ref 0.5–1.4)
ERYTHROCYTE [DISTWIDTH] IN BLOOD BY AUTOMATED COUNT: 41.1 FL (ref 35.9–50)
FASTING STATUS PATIENT QL REPORTED: NORMAL
GFR SERPLBLD CREATININE-BSD FMLA CKD-EPI: 114 ML/MIN/1.73 M 2
GLOBULIN SER CALC-MCNC: 2.8 G/DL (ref 1.9–3.5)
GLUCOSE SERPL-MCNC: 86 MG/DL (ref 65–99)
HCT VFR BLD AUTO: 48.8 % (ref 42–52)
HCV AB SER QL: NORMAL
HDLC SERPL-MCNC: 40 MG/DL
HGB BLD-MCNC: 16 G/DL (ref 14–18)
HIV 1+2 AB+HIV1 P24 AG SERPL QL IA: NORMAL
LDLC SERPL CALC-MCNC: 46 MG/DL
MCH RBC QN AUTO: 27.3 PG (ref 27–33)
MCHC RBC AUTO-ENTMCNC: 32.8 G/DL (ref 32.3–36.5)
MCV RBC AUTO: 83.1 FL (ref 81.4–97.8)
PLATELET # BLD AUTO: 302 K/UL (ref 164–446)
PMV BLD AUTO: 9.9 FL (ref 9–12.9)
POTASSIUM SERPL-SCNC: 4.4 MMOL/L (ref 3.6–5.5)
PROT SERPL-MCNC: 7.3 G/DL (ref 6–8.2)
RBC # BLD AUTO: 5.87 M/UL (ref 4.7–6.1)
SODIUM SERPL-SCNC: 140 MMOL/L (ref 135–145)
TRIGL SERPL-MCNC: 35 MG/DL (ref 0–149)
WBC # BLD AUTO: 5.4 K/UL (ref 4.8–10.8)

## 2024-12-21 PROCEDURE — 36415 COLL VENOUS BLD VENIPUNCTURE: CPT

## 2024-12-21 PROCEDURE — 87389 HIV-1 AG W/HIV-1&-2 AB AG IA: CPT

## 2024-12-21 PROCEDURE — 85027 COMPLETE CBC AUTOMATED: CPT

## 2024-12-21 PROCEDURE — 80053 COMPREHEN METABOLIC PANEL: CPT

## 2024-12-21 PROCEDURE — 86803 HEPATITIS C AB TEST: CPT

## 2024-12-21 PROCEDURE — 80061 LIPID PANEL: CPT

## 2024-12-24 ENCOUNTER — APPOINTMENT (OUTPATIENT)
Dept: SLEEP MEDICINE | Facility: MEDICAL CENTER | Age: 49
End: 2024-12-24
Payer: COMMERCIAL

## 2024-12-26 ENCOUNTER — APPOINTMENT (OUTPATIENT)
Dept: RADIOLOGY | Facility: MEDICAL CENTER | Age: 49
End: 2024-12-26
Attending: STUDENT IN AN ORGANIZED HEALTH CARE EDUCATION/TRAINING PROGRAM
Payer: COMMERCIAL

## 2024-12-26 ENCOUNTER — HOSPITAL ENCOUNTER (EMERGENCY)
Facility: MEDICAL CENTER | Age: 49
End: 2024-12-26
Attending: STUDENT IN AN ORGANIZED HEALTH CARE EDUCATION/TRAINING PROGRAM
Payer: COMMERCIAL

## 2024-12-26 ENCOUNTER — APPOINTMENT (OUTPATIENT)
Dept: SLEEP MEDICINE | Facility: MEDICAL CENTER | Age: 49
End: 2024-12-26
Payer: COMMERCIAL

## 2024-12-26 VITALS
HEART RATE: 70 BPM | WEIGHT: 182 LBS | OXYGEN SATURATION: 95 % | RESPIRATION RATE: 18 BRPM | TEMPERATURE: 97.9 F | DIASTOLIC BLOOD PRESSURE: 76 MMHG | BODY MASS INDEX: 29.38 KG/M2 | SYSTOLIC BLOOD PRESSURE: 118 MMHG

## 2024-12-26 DIAGNOSIS — R10.13 EPIGASTRIC ABDOMINAL PAIN: ICD-10-CM

## 2024-12-26 DIAGNOSIS — R19.7 DIARRHEA, UNSPECIFIED TYPE: ICD-10-CM

## 2024-12-26 DIAGNOSIS — Z91.89 AT RISK FOR SLEEP APNEA: ICD-10-CM

## 2024-12-26 DIAGNOSIS — R11.2 NAUSEA AND VOMITING, UNSPECIFIED VOMITING TYPE: Primary | ICD-10-CM

## 2024-12-26 LAB
ALBUMIN SERPL BCP-MCNC: 4.5 G/DL (ref 3.2–4.9)
ALBUMIN/GLOB SERPL: 1.4 G/DL
ALP SERPL-CCNC: 74 U/L (ref 30–99)
ALT SERPL-CCNC: 45 U/L (ref 2–50)
ANION GAP SERPL CALC-SCNC: 17 MMOL/L (ref 7–16)
AST SERPL-CCNC: 31 U/L (ref 12–45)
BASOPHILS # BLD AUTO: 0.8 % (ref 0–1.8)
BASOPHILS # BLD: 0.07 K/UL (ref 0–0.12)
BILIRUB SERPL-MCNC: 0.6 MG/DL (ref 0.1–1.5)
BUN SERPL-MCNC: 18 MG/DL (ref 8–22)
CALCIUM ALBUM COR SERPL-MCNC: 9.4 MG/DL (ref 8.5–10.5)
CALCIUM SERPL-MCNC: 9.8 MG/DL (ref 8.5–10.5)
CHLORIDE SERPL-SCNC: 100 MMOL/L (ref 96–112)
CO2 SERPL-SCNC: 22 MMOL/L (ref 20–33)
CREAT SERPL-MCNC: 0.94 MG/DL (ref 0.5–1.4)
EKG IMPRESSION: NORMAL
EOSINOPHIL # BLD AUTO: 0.08 K/UL (ref 0–0.51)
EOSINOPHIL NFR BLD: 0.9 % (ref 0–6.9)
ERYTHROCYTE [DISTWIDTH] IN BLOOD BY AUTOMATED COUNT: 40.7 FL (ref 35.9–50)
FLUAV RNA SPEC QL NAA+PROBE: NEGATIVE
FLUBV RNA SPEC QL NAA+PROBE: NEGATIVE
GFR SERPLBLD CREATININE-BSD FMLA CKD-EPI: 98 ML/MIN/1.73 M 2
GLOBULIN SER CALC-MCNC: 3.2 G/DL (ref 1.9–3.5)
GLUCOSE SERPL-MCNC: 130 MG/DL (ref 65–99)
HCT VFR BLD AUTO: 52.3 % (ref 42–52)
HGB BLD-MCNC: 17.3 G/DL (ref 14–18)
IMM GRANULOCYTES # BLD AUTO: 0.04 K/UL (ref 0–0.11)
IMM GRANULOCYTES NFR BLD AUTO: 0.4 % (ref 0–0.9)
LYMPHOCYTES # BLD AUTO: 0.64 K/UL (ref 1–4.8)
LYMPHOCYTES NFR BLD: 6.9 % (ref 22–41)
MCH RBC QN AUTO: 27.6 PG (ref 27–33)
MCHC RBC AUTO-ENTMCNC: 33.1 G/DL (ref 32.3–36.5)
MCV RBC AUTO: 83.4 FL (ref 81.4–97.8)
MONOCYTES # BLD AUTO: 0.59 K/UL (ref 0–0.85)
MONOCYTES NFR BLD AUTO: 6.4 % (ref 0–13.4)
NEUTROPHILS # BLD AUTO: 7.84 K/UL (ref 1.82–7.42)
NEUTROPHILS NFR BLD: 84.6 % (ref 44–72)
NRBC # BLD AUTO: 0 K/UL
NRBC BLD-RTO: 0 /100 WBC (ref 0–0.2)
NT-PROBNP SERPL IA-MCNC: 65 PG/ML (ref 0–125)
PLATELET # BLD AUTO: 257 K/UL (ref 164–446)
PMV BLD AUTO: 10 FL (ref 9–12.9)
POTASSIUM SERPL-SCNC: 3.6 MMOL/L (ref 3.6–5.5)
PROT SERPL-MCNC: 7.7 G/DL (ref 6–8.2)
RBC # BLD AUTO: 6.27 M/UL (ref 4.7–6.1)
RSV RNA SPEC QL NAA+PROBE: NEGATIVE
SARS-COV-2 RNA RESP QL NAA+PROBE: NOTDETECTED
SODIUM SERPL-SCNC: 139 MMOL/L (ref 135–145)
TROPONIN T SERPL-MCNC: 10 NG/L (ref 6–19)
TROPONIN T SERPL-MCNC: 11 NG/L (ref 6–19)
WBC # BLD AUTO: 9.3 K/UL (ref 4.8–10.8)

## 2024-12-26 PROCEDURE — 83880 ASSAY OF NATRIURETIC PEPTIDE: CPT

## 2024-12-26 PROCEDURE — 36415 COLL VENOUS BLD VENIPUNCTURE: CPT

## 2024-12-26 PROCEDURE — 84484 ASSAY OF TROPONIN QUANT: CPT

## 2024-12-26 PROCEDURE — 85025 COMPLETE CBC W/AUTO DIFF WBC: CPT

## 2024-12-26 PROCEDURE — 99284 EMERGENCY DEPT VISIT MOD MDM: CPT

## 2024-12-26 PROCEDURE — 93005 ELECTROCARDIOGRAM TRACING: CPT | Mod: TC

## 2024-12-26 PROCEDURE — 700111 HCHG RX REV CODE 636 W/ 250 OVERRIDE (IP): Performed by: STUDENT IN AN ORGANIZED HEALTH CARE EDUCATION/TRAINING PROGRAM

## 2024-12-26 PROCEDURE — 93005 ELECTROCARDIOGRAM TRACING: CPT | Mod: TC | Performed by: STUDENT IN AN ORGANIZED HEALTH CARE EDUCATION/TRAINING PROGRAM

## 2024-12-26 PROCEDURE — 700102 HCHG RX REV CODE 250 W/ 637 OVERRIDE(OP): Performed by: STUDENT IN AN ORGANIZED HEALTH CARE EDUCATION/TRAINING PROGRAM

## 2024-12-26 PROCEDURE — 71045 X-RAY EXAM CHEST 1 VIEW: CPT

## 2024-12-26 PROCEDURE — 0241U HCHG SARS-COV-2 COVID-19 NFCT DS RESP RNA 4 TRGT ED POC: CPT

## 2024-12-26 PROCEDURE — A9270 NON-COVERED ITEM OR SERVICE: HCPCS | Performed by: STUDENT IN AN ORGANIZED HEALTH CARE EDUCATION/TRAINING PROGRAM

## 2024-12-26 PROCEDURE — 80053 COMPREHEN METABOLIC PANEL: CPT

## 2024-12-26 RX ORDER — ASPIRIN 325 MG
325 TABLET ORAL DAILY
Status: DISCONTINUED | OUTPATIENT
Start: 2024-12-27 | End: 2024-12-26

## 2024-12-26 RX ORDER — ONDANSETRON 4 MG/1
4 TABLET, ORALLY DISINTEGRATING ORAL ONCE
Status: COMPLETED | OUTPATIENT
Start: 2024-12-26 | End: 2024-12-26

## 2024-12-26 RX ORDER — ONDANSETRON 4 MG/1
4 TABLET, ORALLY DISINTEGRATING ORAL EVERY 6 HOURS PRN
Qty: 10 TABLET | Refills: 0 | Status: SHIPPED | OUTPATIENT
Start: 2024-12-26

## 2024-12-26 RX ORDER — ALUMINA, MAGNESIA, AND SIMETHICONE 2400; 2400; 240 MG/30ML; MG/30ML; MG/30ML
10 SUSPENSION ORAL 4 TIMES DAILY PRN
Qty: 560 ML | Refills: 0 | Status: SHIPPED | OUTPATIENT
Start: 2024-12-26

## 2024-12-26 RX ORDER — FAMOTIDINE 20 MG/1
20 TABLET, FILM COATED ORAL 2 TIMES DAILY
Qty: 60 TABLET | Refills: 0 | Status: SHIPPED | OUTPATIENT
Start: 2024-12-26

## 2024-12-26 RX ORDER — ASPIRIN 325 MG
325 TABLET ORAL ONCE
Status: COMPLETED | OUTPATIENT
Start: 2024-12-26 | End: 2024-12-26

## 2024-12-26 RX ORDER — FAMOTIDINE 20 MG/1
20 TABLET, FILM COATED ORAL ONCE
Status: COMPLETED | OUTPATIENT
Start: 2024-12-26 | End: 2024-12-26

## 2024-12-26 RX ADMIN — ONDANSETRON 4 MG: 4 TABLET, ORALLY DISINTEGRATING ORAL at 20:42

## 2024-12-26 RX ADMIN — ASPIRIN 325 MG: 325 TABLET ORAL at 20:46

## 2024-12-26 RX ADMIN — FAMOTIDINE 20 MG: 20 TABLET, FILM COATED ORAL at 20:41

## 2024-12-26 RX ADMIN — LIDOCAINE HYDROCHLORIDE 30 ML: 20 SOLUTION ORAL; TOPICAL at 20:41

## 2024-12-26 ASSESSMENT — HEART SCORE
HEART SCORE: 4
AGE: 45-64
TROPONIN: LESS THAN OR EQUAL TO NORMAL LIMIT
RISK FACTORS: >2 RISK FACTORS OR HX OF ATHEROSCLEROTIC DISEASE
HISTORY: SLIGHTLY SUSPICIOUS
ECG: NON-SPECIFIC REPOLARIZATION DISTURBANCE

## 2024-12-26 ASSESSMENT — PAIN DESCRIPTION - PAIN TYPE: TYPE: ACUTE PAIN

## 2024-12-26 ASSESSMENT — FIBROSIS 4 INDEX: FIB4 SCORE: 0.69

## 2024-12-27 ENCOUNTER — TELEPHONE (OUTPATIENT)
Dept: SLEEP MEDICINE | Facility: MEDICAL CENTER | Age: 49
End: 2024-12-27
Payer: COMMERCIAL

## 2024-12-27 NOTE — ED NOTES
.Bedside report received from Gerry RN, assumed care of patient. Patient resting in gurney, respirations even, non labored, vitals stable. Gurney in locked and lowest position, rails raised for patient safety. Call light within reach, denies needs at this time.         /64   Pulse 74   Temp 35.8 °C (96.5 °F) (Temporal)   Resp 18   Wt 82.6 kg (182 lb)   SpO2 94%

## 2024-12-27 NOTE — ED TRIAGE NOTES
Chief Complaint   Patient presents with    Chest Pain    Dizziness    Nausea     Pt states sx onset 30 min ago     EKG done prior to triage

## 2024-12-27 NOTE — TELEPHONE ENCOUNTER
Called and spoke with patient, asked to repeat home sleep study due to insufficient data. Patient states he can  device Monday 12/30/2024 after 10 am. Tech informed, device at . Patient will need to review instructions.

## 2024-12-27 NOTE — DISCHARGE INSTRUCTIONS
You are seen and evaluated the emergency department for your symptoms.  Your labs, EKG, chest x-ray are largely normal.  I do not think you are having an acute ischemic event of your heart at this time.  You were treated with medications and had improvement in symptoms, you are tolerating orals here.  Your vital signs were normal.  We had shared decision making regarding potential stay in the hospital versus going home.  We have elected to choose going home with PCP follow-up at this time.  I also suggest he follow-up with your cardiologist as needed.  If you develop any other concerning symptoms feel free to return to the emergency department for further evaluation.

## 2025-01-07 ENCOUNTER — OFFICE VISIT (OUTPATIENT)
Dept: CARDIOLOGY | Facility: MEDICAL CENTER | Age: 50
End: 2025-01-07
Attending: INTERNAL MEDICINE
Payer: COMMERCIAL

## 2025-01-07 VITALS
SYSTOLIC BLOOD PRESSURE: 116 MMHG | DIASTOLIC BLOOD PRESSURE: 70 MMHG | WEIGHT: 174 LBS | OXYGEN SATURATION: 97 % | RESPIRATION RATE: 16 BRPM | BODY MASS INDEX: 26.37 KG/M2 | HEIGHT: 68 IN | HEART RATE: 70 BPM

## 2025-01-07 DIAGNOSIS — I10 ESSENTIAL HYPERTENSION: ICD-10-CM

## 2025-01-07 DIAGNOSIS — E11.59 TYPE 2 DIABETES MELLITUS WITH OTHER CIRCULATORY COMPLICATION, WITHOUT LONG-TERM CURRENT USE OF INSULIN (HCC): ICD-10-CM

## 2025-01-07 DIAGNOSIS — E78.5 DYSLIPIDEMIA: ICD-10-CM

## 2025-01-07 DIAGNOSIS — I10 PRIMARY HYPERTENSION: ICD-10-CM

## 2025-01-07 DIAGNOSIS — I25.9 ISCHEMIC HEART DISEASE DUE TO CORONARY ARTERY OBSTRUCTION (HCC): ICD-10-CM

## 2025-01-07 DIAGNOSIS — I25.10 CORONARY ARTERY DISEASE INVOLVING NATIVE CORONARY ARTERY OF NATIVE HEART WITHOUT ANGINA PECTORIS: ICD-10-CM

## 2025-01-07 DIAGNOSIS — I22.0: ICD-10-CM

## 2025-01-07 DIAGNOSIS — I24.0 ISCHEMIC HEART DISEASE DUE TO CORONARY ARTERY OBSTRUCTION (HCC): ICD-10-CM

## 2025-01-07 PROCEDURE — 3078F DIAST BP <80 MM HG: CPT | Performed by: INTERNAL MEDICINE

## 2025-01-07 PROCEDURE — 99212 OFFICE O/P EST SF 10 MIN: CPT | Performed by: INTERNAL MEDICINE

## 2025-01-07 PROCEDURE — 99214 OFFICE O/P EST MOD 30 MIN: CPT | Performed by: INTERNAL MEDICINE

## 2025-01-07 PROCEDURE — 3074F SYST BP LT 130 MM HG: CPT | Performed by: INTERNAL MEDICINE

## 2025-01-07 ASSESSMENT — FIBROSIS 4 INDEX: FIB4 SCORE: 0.92

## 2025-01-07 NOTE — PROGRESS NOTES
Admission Date / Service Date: 12/10/24    Patient's PCP: Lakia Lanza M.D.      HPI: Mr. Shaffer is a 49-year-old male with coronary artery disease s/p anterior STEMI 2024 treated with TNK followed by PCI of the LAD with a 2.5 x 38 mm [place] GORDO to the proximal LAD and a 2.25 x 38 mm [place] GORDO to the mid to distal LAD, hypertension, newly diagnosed type 2 diabetes mellitus, dyslipidemia, and obesity who is here for evaluation of his cardiac status.    He has not had any further chest pain or pressure.   He denies any shortness of breath, dyspnea on exertion, orthopnea, PND, or lower extremity edema.  No syncope or near syncope.  No palpitations.  He is walking for an hour on the weekends without difficulties.  He also stays active at work and says he walks a lot at work.  He has not heard from cardiac rehab yet.    On , he had some epigastric pain with nausea and vomiting as well as some diarrhea.  He went to the emergency room for evaluation.  Troponins were negative and EKG showed anterior infarct.  He was given GI cocktail, Pepcid, and Zofran with resolution of his symptoms.  He has not had any further symptoms.    He is monitoring his blood pressure and it is mostly 100s to 110s systolic over 70s diastolic.    He has lost approximately 30 pounds since his heart attack with changing his diet.    Cardiology Results:  EK/10/2024 EKG ordered and interpreted by me today shows sinus bradycardia at 55 bpm.  Right axis deviation.  Nonspecific ST-T wave abnormalities in the anterior leads.    Echo: 2024: Mildly depressed LV systolic function with EF 50% with apical hypokinesis.  Mild LVH.  Grade 1 diastolic dysfunction.  No significant valvular abnormalities.      Cath:  2024 in [place], [place]: 2 vessel CAD.  99% prox LAD, 80% diffuse mid and distal CAD.  BLAINE 3 flow post TNK thrombolysis.  EF= 55%.  EDP= 8 mmHg.  Successful PTCA/stenting of LAD with 2 overlapping GORDO.  Distal stent:  2.25X 38 mm [place], prox overlapping GORDO 2.5X 38 mm [place].  Left main had 30% distal stenosis, LAD proximally subtotally occluded followed by 80% mid to distal stenosis.  Circumflex artery gave rise to a large bifurcating OM1 branch which had 40% proximal stenosis and then 50% stenosis involving the takeoff of the superior branch, RCA was dominant with 40% proximal followed by 30% distal stenosis, PDA had 50% proximal stenosis and PL branch had 40% mid stenosis.    Stress test:    Current list of administered Medications:    Current Outpatient Medications:     famotidine (PEPCID) 20 MG Tab, Take 1 Tablet by mouth 2 times a day., Disp: 60 Tablet, Rfl: 0    ondansetron (ZOFRAN ODT) 4 MG TABLET DISPERSIBLE, Take 1 Tablet by mouth every 6 hours as needed for Nausea/Vomiting., Disp: 10 Tablet, Rfl: 0    mag hydrox-al hydrox-simeth (MAALOX PLUS ES OR MYLANTA DS) 400-400-40 MG/5ML Suspension, Take 10 mL by mouth 4 times a day as needed (abdominal pain)., Disp: 560 mL, Rfl: 0    JARDIANCE 10 MG Tab tablet, Take 10 mg by mouth every day., Disp: , Rfl:     lisinopril (PRINIVIL) 5 MG Tab, Take 2.5 mg by mouth every day., Disp: , Rfl:     PLAVIX 75 MG Tab, Take 75 mg by mouth every day., Disp: , Rfl:     TOPROL XL 25 MG TABLET SR 24 HR, Take 25 mg by mouth every day., Disp: , Rfl:     nitroglycerin (NITROSTAT) 0.4 MG SL Tab, Place 0.4 mg under the tongue as needed for Chest Pain., Disp: , Rfl:     atorvastatin (LIPITOR) 80 MG tablet, Take 1 Tablet by mouth every evening., Disp: 100 Tablet, Rfl: 2    aspirin (ASA) 81 MG Chew Tab chewable tablet, Take 81 mg by mouth every day., Disp: , Rfl:     Past Medical History:   Diagnosis Date    Diabetes (HCC)     MI (myocardial infarction) (HCC)        No past surgical history on file.    Family History   Problem Relation Age of Onset    Valvular heart disease Mother     Heart Attack Father 50             No Known Allergies    Review of systems:  All others systems reviewed and negative.  No  fever, chills. No nausea vomiting.    Physical exam:  Vitals:    01/07/25 1040   BP: 116/70   Pulse: 70   Resp: 16   SpO2: 97%          General: No acute distress. Well nourished.  HEENT: Normocephalic.  Atraumatic.  EOM grossly intact, no scleral icterus.  Neck:  No JVD, no bruits  CVS: RRR. Normal S1, S2. No murmurs, gallops, or rubs.   Resp: CTAB. No wheezing or crackles/rhonchi. Normal respiratory effort.  Abdomen: Soft, NT, no sarkis hepatomegaly.  MSK/Ext: No clubbing or cyanosis. No edema.  Skin: Warm and dry, no rashes.  Neurological: CN III-XII grossly intact. No focal deficits.   Psych: A&O x 3, appropriate affect, good judgement  Pulses: Carotid, radial, and posterior tibial pulses are 2+ bilaterally.      Data:  Laboratory studies:  Lab Results   Component Value Date/Time    WBC 9.3 12/26/2024 06:34 PM    RBC 6.27 (H) 12/26/2024 06:34 PM    HEMOGLOBIN 17.3 12/26/2024 06:34 PM    HEMATOCRIT 52.3 (H) 12/26/2024 06:34 PM    MCV 83.4 12/26/2024 06:34 PM    MCH 27.6 12/26/2024 06:34 PM    MCHC 33.1 12/26/2024 06:34 PM    MPV 10.0 12/26/2024 06:34 PM    NEUTSPOLYS 84.60 (H) 12/26/2024 06:34 PM    LYMPHOCYTES 6.90 (L) 12/26/2024 06:34 PM    MONOCYTES 6.40 12/26/2024 06:34 PM    EOSINOPHILS 0.90 12/26/2024 06:34 PM    BASOPHILS 0.80 12/26/2024 06:34 PM        Lab Results   Component Value Date/Time    SODIUM 139 12/26/2024 06:34 PM    POTASSIUM 3.6 12/26/2024 06:34 PM    CHLORIDE 100 12/26/2024 06:34 PM    CO2 22 12/26/2024 06:34 PM    GLUCOSE 130 (H) 12/26/2024 06:34 PM    BUN 18 12/26/2024 06:34 PM    CREATININE 0.94 12/26/2024 06:34 PM      Lipid panel December 21, 2024: Total cholesterol 93, triglycerides 35, HDL 40, LDL 46.    Laboratory data November 12, 2024 showed hemoglobin A1c of 6.3 in PCPs office.    Assessment :  1. Primary hypertension  CANCELED: EKG      2. Coronary artery disease involving native coronary artery of native heart without angina pectoris  Referral to Cardiac Rehab    Lipoprotein (a)       3. Subsequent ST elevation (STEMI) myocardial infarction of anterior wall (HCC)  Referral to Cardiac Rehab      4. Ischemic heart disease due to coronary artery obstruction (HCC)  Referral to Cardiac Rehab      5. Essential hypertension        6. Dyslipidemia  Comp Metabolic Panel    Lipid Profile    Lipoprotein (a)      7. Type 2 diabetes mellitus with other circulatory complication, without long-term current use of insulin (HCC)  HEMOGLOBIN A1C           Plan:  I will continue the patient's current medications.  I will again refer him to cardiac rehabilitation.  We discussed use of sublingual nitroglycerin in detail.  We discussed low-salt, low-fat, diabetic diet.  We discussed exercise.  I instructed him to increase his walking to at least 30 minutes 4 to 5 days a week.  I instructed him to continue monitoring his blood pressure.  He will return to clinic in approximately 3 months or sooner if he has any problems.  I will obtain a fasting lipid panel, CMP, lipoprotein a, and hemoglobin A1c prior to that clinic visit.    Thank you for allowing me to participate in this patient's care.  Please do not hesitate to contact me with questions or concerns.    Gali Cruz MD, [facility]  Cardiologist   [facility]    Please note that this dictation was created using voice recognition software. I have made every reasonable attempt to correct obvious errors, but it is possible there are errors of grammar and possibly content that I did not discover before finalizing the note.

## 2025-01-29 ENCOUNTER — OFFICE VISIT (OUTPATIENT)
Dept: INTERNAL MEDICINE | Facility: OTHER | Age: 50
End: 2025-01-29
Payer: COMMERCIAL

## 2025-01-29 VITALS
TEMPERATURE: 97.9 F | HEIGHT: 68 IN | DIASTOLIC BLOOD PRESSURE: 75 MMHG | BODY MASS INDEX: 25.91 KG/M2 | SYSTOLIC BLOOD PRESSURE: 119 MMHG | WEIGHT: 171 LBS | OXYGEN SATURATION: 97 % | HEART RATE: 65 BPM

## 2025-01-29 DIAGNOSIS — R73.03 PREDIABETES: ICD-10-CM

## 2025-01-29 DIAGNOSIS — Z23 NEED FOR VACCINATION: ICD-10-CM

## 2025-01-29 DIAGNOSIS — I10 ESSENTIAL HYPERTENSION: ICD-10-CM

## 2025-01-29 ASSESSMENT — FIBROSIS 4 INDEX: FIB4 SCORE: 0.92

## 2025-01-29 ASSESSMENT — PATIENT HEALTH QUESTIONNAIRE - PHQ9: CLINICAL INTERPRETATION OF PHQ2 SCORE: 0

## 2025-01-29 NOTE — PROGRESS NOTES
"      Office Visit Follow up    Chief Complaint   Patient presents with    Follow-Up       Subjective   Mr. Shaffer is a 49 yoM with MH of hyperlipidemia, hypertension, prediabetes, obesity, and a recent hospitalization for anterior STEMI s/p stenting x2 to LAD.  States he is doing well, he has no acute complaints.  He has lost about 20 pounds since I saw him, due to significant changes in his diet. Denies shortness of breath, dyspnea on exertion, orthopnea, PND, or lower extremity edema. No syncope/lightheadedness. No palpitations.     On December 26, he had an ER visits due to epigastric pain with nausea and vomiting as well as some diarrhea. Troponins were negative and EKG was unchanged. He was given GI cocktail, Pepcid, and Zofran with resolution of his symptoms. He has not had any abdominal symptoms since then.     She has to  new device for sleep study.  Has not done Cologuard. Upcoming cardiac rehab.     ROS     General: No fevers, chills, night sweats, weight loss or gain.  H&N: No hearing changes, vision changes, eye pain, ear pain, nasal discharge, sore throat, no neck swelling.  Pulmonary: No shortness of breath, cough, sputum, or hemoptysis.  GI: No nausea, vomiting, diarrhea, constipation, abdominal pain, hematochezia or melena.  : No dysuria, frequency, retention or hematuria.   Neuro: No headaches, no lightheadedness, no dizziness. No focal weakness, no general weakness. No gait disturbances.   Psych: No anxiety or depression.      Physical Exam     /75 (BP Location: Left arm, Patient Position: Sitting, BP Cuff Size: Adult)   Pulse 65   Temp 36.6 °C (97.9 °F) (Temporal)   Ht 1.727 m (5' 8\")   Wt 77.6 kg (171 lb)   SpO2 97%   BMI 26.00 kg/m²     General: No acute distress, good interaction.   Head and Neck: NC/AT, EOMI, no scleral icterus or conjunctival pallor.  CV: Rhythmic heart sounds, no murmurs, gallops or rubs. No S3,S4 or JVD.  Pulm: Chest expansion is symmetrical, no " crackles, rales, rhonchi, or wheezing.  GI: Flat, non distended, soft, non tender, normal bowel sounds.  Skin: Warm, no rashes, no lesions in visible areas.  MSK: Normal ROM. No lower extremity edema. No lesions.   Neuro: Patient is alert and oriented x3. Speech is clear and fluent, goal directed. Pupils equal, round and reactive to light. Good eye contact. Extra ocular movements intact. Facial and body symmetry without obvious focal deficits.  Psych: Appropriate mood and affect.     Diagnostic tests  I have reviewed all pertinent labs and diagnostic tests associated with this visit with specific comments listed under the assessment and plan below    Assessment and Plan    Prediabetes  BMI 30 -> 26   A1c last visit 6.3%.  No prior history of insulin resistance.   Significant weight loss since last visit  -Praised his excellent work on dietary changes and exercise, continue to work on this   -On Jardiance per cardiology    CAD s/p 2 stents to LAD   Hyperlipidemia  Hypertension  Establish with cardiology.  Asymptomatic.  Significant lifestyle changes.  BP at goal.  -Defer further management to cardiology  -Pending cardiac rehab  -On Plavix, aspirin, high intensity statin, metoprolol and lisinopril  -Pending repeat a sleep apnea test with CPAP titration  -PCV 20 given in office    HCM  -Pending Cologuard    Return in about 3 months (around 4/29/2025).    Lakia ADAMS PGY-3  Internal Medicine [employer]    Pt has been discussed with the Attending Physician

## 2025-01-29 NOTE — PATIENT INSTRUCTIONS
Congratulations on your strong work!  Thank you for coming today!   Please remember a balanced lifestyle helps to keep your heart healthy. Some tips for a healthy heart include:  Exercise for 30 minutes, at least 5 times a week or 1 hour 3 times a week.   Avoid fatty, processed, sweetened food.   Reduce alcohol intake. Do not smoke.   Get the covid booster  Get the cologuad done  Go to cardiac rehab   Please call the sleep study office

## 2025-01-30 PROBLEM — E11.9 DIABETES MELLITUS (HCC): Status: RESOLVED | Noted: 2024-12-10 | Resolved: 2025-01-30

## 2025-01-30 PROBLEM — E66.09 OBESITY DUE TO EXCESS CALORIES WITH SERIOUS COMORBIDITY: Status: RESOLVED | Noted: 2024-11-12 | Resolved: 2025-01-30

## 2025-01-30 PROBLEM — E66.9 OBESITY (BMI 30-39.9): Status: RESOLVED | Noted: 2024-11-12 | Resolved: 2025-01-30

## 2025-01-30 NOTE — PROCEDURES
DIAGNOSTIC HOME SLEEP TEST (HST) REPORT WatchPAT      PATIENT ID:  NAME:  Abdulaziz Shaffer  MRN:               [id number]  YOB: 1975  DATE OF STUDY: 1/4/2025      Impression:     This study shows evidence of:      1.  Severe obstructive sleep apnea with PAT apnea hypopnea index(3% pAHI) of 31.5 per hour.  PAT respiratory disturbance index (pRDI) was 32.8 per hour. These findings are based on 7 channels recording of PAT signal with sleep staging, heart rate, pulse oximetry, actigraphy, body position, snoring and respiratory movement.     2. Oxygenation O2 Sat. mean O2 sat was 91%,  phuong was 82%,  and maximum O2 at 99%. O2 sat was at or  below 88% for 24.2 min of evaluation time. Oxygen Desaturation (>=4%) Index was 19.6/hr. AVG HR was 58 BPM.      TECHNICAL DESCRIPTION: Patient underwent home sleep apnea testing with peripheral arterial tone signal (WatchPAT™). This is a Type IV portable monitor and device per Medicare. Monitoring was done with 7 channels recording of PAT signal with sleep staging, heart rate, pulse oximetry, actigraphy, body position, snoring and respiratory movement. Prior to using the device, the patient received verbal and written instructions for its application and was provided with the help desk phone number for additional telephonic instruction with 24-hour availability of qualified personnel to answer questions.    Respiratory events:            General sleep summary: . Total recording time is 9 hours and 51 minutes and total Sleep time is 8 hours and 29 minutes. The patient spent 207.5 minutes in the supine position and 301.8 minutes in the nonsupine position.      Recommendations:    1.  Severe KAT with severe nocturnal hypoxemia. Given severity of obstructive sleep apnea and nocturnal hypoxia, patient would benefit from undergoing in lab polysomnography CPAP/BiPAP titration study. There is the potential patient may require more advanced modes of PAP therapy or  supplemental oxygen with PAP therapy which is best assessed in the sleep lab.       2. I also recommend 30 day compliance download to assess the efficacy to the recommended pressure, measure leak, apnea hypopnea index and compliance for further outpatient monitoring and management of PAP therapy. In some cases alternative treatment options may be proven effective in resolving sleep apnea. These options include upper airway surgery, the use of a dental orthotic, weight loss, or positional therapy. Clinical correlation is required. In general patients with sleep apnea are advised to avoid alcohol, sedatives and not to operate a motor vehicle while drowsy. Untreated sleep apnea increases the risk for cardiovascular and neurovascular disease.            Heidy Zepeda MD

## 2025-02-03 ENCOUNTER — TELEPHONE (OUTPATIENT)
Dept: CARDIOLOGY | Facility: MEDICAL CENTER | Age: 50
End: 2025-02-03
Payer: COMMERCIAL

## 2025-02-03 ENCOUNTER — NON-PROVIDER VISIT (OUTPATIENT)
Dept: CARDIOLOGY | Facility: MEDICAL CENTER | Age: 50
End: 2025-02-03
Payer: COMMERCIAL

## 2025-02-03 DIAGNOSIS — Z95.5 STENTED CORONARY ARTERY: Primary | ICD-10-CM

## 2025-02-03 NOTE — TELEPHONE ENCOUNTER
ANI    Caller: Abdulaziz Shaffer Jr.     Topic/issue: Patient is calling to let ANI hat he will not be doing cardiac rehab because it is too expensive.  Please advise.    Callback Number: [phone]     Thank you,  Danae RIVERA

## 2025-02-04 ENCOUNTER — PATIENT MESSAGE (OUTPATIENT)
Dept: CARDIOLOGY | Facility: MEDICAL CENTER | Age: 50
End: 2025-02-04
Payer: COMMERCIAL

## 2025-02-04 RX ORDER — ASPIRIN 81 MG/1
81 TABLET, CHEWABLE ORAL DAILY
Qty: 100 TABLET | Refills: 2 | Status: SHIPPED | OUTPATIENT
Start: 2025-02-04

## 2025-02-04 NOTE — TELEPHONE ENCOUNTER
Received request via: Patient    Was the patient seen in the last year in this department? Yes    Does the patient have an active prescription (recently filled or refills available) for medication(s) requested? No    Pharmacy Name: [facility]    Does the patient have assisted Plus and need 100-day supply? (This applies to ALL medications) Patient does not have SCP

## 2025-02-04 NOTE — TELEPHONE ENCOUNTER
attempt to call pt, [phone] to reach.  Unable to leave voicemail as voicemail is full    Upon chart review, pt is active on [employer] [facility].  Last login 2/4/2025.  [facility] message sent regarding Dr. Cruz's advise

## 2025-02-04 NOTE — TELEPHONE ENCOUNTER
Sorry to hear that patient has decided not to proceed with cardiac rehabilitation.  Please let him know that mortality is improved and patient to undergo cardiac rehabilitation.  If he cannot afford cardiac rehabilitation, he should start doing aerobic exercise on his own.  He should start walking at least 10 to 15 minutes 5 days a week and gradually build up to 30 minutes 5 days a week.  Thank you.

## 2025-03-31 ENCOUNTER — TELEPHONE (OUTPATIENT)
Dept: CARDIOLOGY | Facility: MEDICAL CENTER | Age: 50
End: 2025-03-31
Payer: COMMERCIAL

## 2025-04-01 NOTE — TELEPHONE ENCOUNTER
Called Pt, no answer, left voicemail.   Reminded Pt of upcoming appointment and that ANI did want some fasting labs done before then. If Pt needs paper copies, I provided phone number so he can call back and request them.

## 2025-04-05 ENCOUNTER — HOSPITAL ENCOUNTER (OUTPATIENT)
Dept: LAB | Facility: MEDICAL CENTER | Age: 50
End: 2025-04-05
Attending: INTERNAL MEDICINE
Payer: COMMERCIAL

## 2025-04-05 DIAGNOSIS — I25.10 CORONARY ARTERY DISEASE INVOLVING NATIVE CORONARY ARTERY OF NATIVE HEART WITHOUT ANGINA PECTORIS: ICD-10-CM

## 2025-04-05 DIAGNOSIS — E78.5 DYSLIPIDEMIA: ICD-10-CM

## 2025-04-05 DIAGNOSIS — E11.59 TYPE 2 DIABETES MELLITUS WITH OTHER CIRCULATORY COMPLICATION, WITHOUT LONG-TERM CURRENT USE OF INSULIN (HCC): ICD-10-CM

## 2025-04-05 LAB
ALBUMIN SERPL BCP-MCNC: 4.6 G/DL (ref 3.2–4.9)
ALBUMIN/GLOB SERPL: 1.5 G/DL
ALP SERPL-CCNC: 65 U/L (ref 30–99)
ALT SERPL-CCNC: 40 U/L (ref 2–50)
ANION GAP SERPL CALC-SCNC: 10 MMOL/L (ref 7–16)
AST SERPL-CCNC: 32 U/L (ref 12–45)
BILIRUB SERPL-MCNC: 0.9 MG/DL (ref 0.1–1.5)
BUN SERPL-MCNC: 14 MG/DL (ref 8–22)
CALCIUM ALBUM COR SERPL-MCNC: 9.5 MG/DL (ref 8.5–10.5)
CALCIUM SERPL-MCNC: 10 MG/DL (ref 8.5–10.5)
CHLORIDE SERPL-SCNC: 102 MMOL/L (ref 96–112)
CHOLEST SERPL-MCNC: 112 MG/DL (ref 100–199)
CO2 SERPL-SCNC: 26 MMOL/L (ref 20–33)
CREAT SERPL-MCNC: 0.76 MG/DL (ref 0.5–1.4)
EST. AVERAGE GLUCOSE BLD GHB EST-MCNC: 126 MG/DL
GFR SERPLBLD CREATININE-BSD FMLA CKD-EPI: 108 ML/MIN/1.73 M 2
GLOBULIN SER CALC-MCNC: 3 G/DL (ref 1.9–3.5)
GLUCOSE SERPL-MCNC: 88 MG/DL (ref 65–99)
HBA1C MFR BLD: 6 % (ref 4–5.6)
HDLC SERPL-MCNC: 53 MG/DL
LDLC SERPL CALC-MCNC: 50 MG/DL
POTASSIUM SERPL-SCNC: 5 MMOL/L (ref 3.6–5.5)
PROT SERPL-MCNC: 7.6 G/DL (ref 6–8.2)
SODIUM SERPL-SCNC: 138 MMOL/L (ref 135–145)
TRIGL SERPL-MCNC: 43 MG/DL (ref 0–149)

## 2025-04-05 PROCEDURE — 83036 HEMOGLOBIN GLYCOSYLATED A1C: CPT

## 2025-04-05 PROCEDURE — 80061 LIPID PANEL: CPT

## 2025-04-05 PROCEDURE — 83695 ASSAY OF LIPOPROTEIN(A): CPT

## 2025-04-05 PROCEDURE — 80053 COMPREHEN METABOLIC PANEL: CPT

## 2025-04-05 PROCEDURE — 36415 COLL VENOUS BLD VENIPUNCTURE: CPT

## 2025-04-07 ENCOUNTER — OFFICE VISIT (OUTPATIENT)
Dept: CARDIOLOGY | Facility: MEDICAL CENTER | Age: 50
End: 2025-04-07
Attending: INTERNAL MEDICINE
Payer: COMMERCIAL

## 2025-04-07 ENCOUNTER — RESULTS FOLLOW-UP (OUTPATIENT)
Dept: CARDIOLOGY | Facility: MEDICAL CENTER | Age: 50
End: 2025-04-07

## 2025-04-07 VITALS
BODY MASS INDEX: 24.4 KG/M2 | DIASTOLIC BLOOD PRESSURE: 62 MMHG | WEIGHT: 161 LBS | HEART RATE: 62 BPM | HEIGHT: 68 IN | SYSTOLIC BLOOD PRESSURE: 108 MMHG | RESPIRATION RATE: 16 BRPM | OXYGEN SATURATION: 98 %

## 2025-04-07 DIAGNOSIS — I24.0 ISCHEMIC HEART DISEASE DUE TO CORONARY ARTERY OBSTRUCTION (HCC): ICD-10-CM

## 2025-04-07 DIAGNOSIS — I25.10 CORONARY ARTERY DISEASE INVOLVING NATIVE CORONARY ARTERY OF NATIVE HEART WITHOUT ANGINA PECTORIS: ICD-10-CM

## 2025-04-07 DIAGNOSIS — I10 ESSENTIAL HYPERTENSION: ICD-10-CM

## 2025-04-07 DIAGNOSIS — I25.2 OLD MYOCARDIAL INFARCTION: ICD-10-CM

## 2025-04-07 DIAGNOSIS — E11.59 TYPE 2 DIABETES MELLITUS WITH OTHER CIRCULATORY COMPLICATION, WITHOUT LONG-TERM CURRENT USE OF INSULIN (HCC): ICD-10-CM

## 2025-04-07 DIAGNOSIS — E78.5 DYSLIPIDEMIA: ICD-10-CM

## 2025-04-07 DIAGNOSIS — I25.9 ISCHEMIC HEART DISEASE DUE TO CORONARY ARTERY OBSTRUCTION (HCC): ICD-10-CM

## 2025-04-07 LAB — LPA SERPL-MCNC: 11 MG/DL

## 2025-04-07 PROCEDURE — 99212 OFFICE O/P EST SF 10 MIN: CPT | Performed by: INTERNAL MEDICINE

## 2025-04-07 PROCEDURE — 3074F SYST BP LT 130 MM HG: CPT | Performed by: INTERNAL MEDICINE

## 2025-04-07 PROCEDURE — 3078F DIAST BP <80 MM HG: CPT | Performed by: INTERNAL MEDICINE

## 2025-04-07 PROCEDURE — 99214 OFFICE O/P EST MOD 30 MIN: CPT | Performed by: INTERNAL MEDICINE

## 2025-04-07 RX ORDER — METOPROLOL SUCCINATE 25 MG
25 TABLET, EXTENDED RELEASE 24 HR ORAL DAILY
Qty: 100 TABLET | Refills: 3 | Status: SHIPPED | OUTPATIENT
Start: 2025-04-07

## 2025-04-07 RX ORDER — CLOPIDOGREL BISULFATE 75 MG
75 TABLET ORAL DAILY
Qty: 100 TABLET | Refills: 3 | Status: SHIPPED | OUTPATIENT
Start: 2025-04-07 | End: 2025-04-28 | Stop reason: SDUPTHER

## 2025-04-07 RX ORDER — LISINOPRIL 2.5 MG/1
2.5 TABLET ORAL DAILY
Qty: 100 TABLET | Refills: 3 | Status: SHIPPED | OUTPATIENT
Start: 2025-04-07

## 2025-04-07 RX ORDER — EMPAGLIFLOZIN 10 MG/1
10 TABLET, FILM COATED ORAL DAILY
Qty: 100 EACH | Refills: 3 | Status: SHIPPED | OUTPATIENT
Start: 2025-04-07

## 2025-04-07 ASSESSMENT — FIBROSIS 4 INDEX: FIB4 SCORE: 1

## 2025-04-07 NOTE — PROGRESS NOTES
Admission Date / Service Date: 12/10/24    Patient's PCP: Lakia Lanza M.D.      HPI: Mr. Shaffer is a 49-year-old male with CAD s/p anterior STEMI 2024 treated with TNK followed by PCI of the LAD with a 2.5 x 38 mm Slick GORDO to the proximal LAD and a 2.25 x 38 mm Primghar GORDO to the mid to distal LAD, hypertension, recently diagnosed type 2 diabetes mellitus, dyslipidemia, and obesity who is here for follow-up of his cardiac status.    He overall is doing well.  He denies any chest pain or pressure.  He is riding his stationary bicycle for an hour a few days a week and walking an hour a few days a week without difficulties.  He also stays active at work.  He denies any shortness of breath, dyspnea on exertion, orthopnea, PND, or lower extremity edema.  No syncope or near syncope.    Unfortunately his insurance would not cover cardiac rehabilitation.  He said he would have to pay $200 per session out-of-pocket and cannot afford this.    He is monitoring his blood pressure and it is mostly 100s to 110s systolic over 70s diastolic.    He has lost approximately 40 pounds since his heart attack with changing his diet.    Cardiology Results:  EK/10/2024 EKG ordered and interpreted by me today shows sinus bradycardia at 55 bpm.  Right axis deviation.  Nonspecific ST-T wave abnormalities in the anterior leads.    Echo: 2024: Mildly depressed LV systolic function with EF 50% with apical hypokinesis.  Mild LVH.  Grade 1 diastolic dysfunction.  No significant valvular abnormalities.      Cath:  2024 in [place], [place]: 2 vessel CAD.  99% prox LAD, 80% diffuse mid and distal CAD.  BLAINE 3 flow post TNK thrombolysis.  EF= 55%.  EDP= 8 mmHg.  Successful PTCA/stenting of LAD with 2 overlapping GORDO.  Distal stent: 2.25X 38 mm Primghar, prox overlapping GORDO 2.5X 38 mm Slick.  Left main had 30% distal stenosis, LAD proximally subtotally occluded followed by 80% mid to distal stenosis.  Circumflex artery gave rise to  a large bifurcating OM1 branch which had 40% proximal stenosis and then 50% stenosis involving the takeoff of the superior branch, RCA was dominant with 40% proximal followed by 30% distal stenosis, PDA had 50% proximal stenosis and PL branch had 40% mid stenosis.    Stress test:    Current list of administered Medications:    Current Outpatient Medications:     lisinopril (PRINIVIL) 2.5 MG Tab, Take 1 Tablet by mouth every day., Disp: 100 Tablet, Rfl: 3    PLAVIX 75 MG Tab, Take 1 Tablet by mouth every day., Disp: 100 Tablet, Rfl: 3    TOPROL XL 25 MG TABLET SR 24 HR, Take 1 Tablet by mouth every day., Disp: 100 Tablet, Rfl: 3    aspirin (ASA) 81 MG Chew Tab chewable tablet, Chew 1 Tablet every day., Disp: 100 Tablet, Rfl: 2    nitroglycerin (NITROSTAT) 0.4 MG SL Tab, Place 0.4 mg under the tongue as needed for Chest Pain., Disp: , Rfl:     atorvastatin (LIPITOR) 80 MG tablet, Take 1 Tablet by mouth every evening., Disp: 100 Tablet, Rfl: 2    JARDIANCE 10 MG Tab tablet, Take 1 Tablet by mouth every day., Disp: 100 Each, Rfl: 3    Past Medical History:   Diagnosis Date    Diabetes (HCC)     MI (myocardial infarction) (HCC)        History reviewed. No pertinent surgical history.    Family History   Problem Relation Age of Onset    Valvular heart disease Mother     Heart Attack Father 50       No Known Allergies    Review of systems:  All others systems reviewed and negative.  No fever, chills. No nausea vomiting.    Physical exam:  Vitals:    04/07/25 1450   BP: 108/62   Pulse: 62   Resp: 16   SpO2: 98%       General: No acute distress. Well nourished.  HEENT: Normocephalic.  Atraumatic.  EOM grossly intact, no scleral icterus.  Neck:  No JVD, no bruits  CVS: RRR. Normal S1, S2. No murmurs, gallops, or rubs.   Resp: CTAB. No wheezing or crackles/rhonchi. Normal respiratory effort.  Abdomen: Soft, NT, no sarkis hepatomegaly.  MSK/Ext: No clubbing or cyanosis. No edema.  Skin: Warm and dry, no rashes.  Neurological: CN  III-XII grossly intact. No focal deficits.   Psych: A&O x 3, appropriate affect, good judgement  Pulses: Carotid, radial, and posterior tibial pulses are 2+ bilaterally.      Data:  Laboratory studies:    Laboratory data April 5, 2025: CMP normal.  Hemoglobin A1c 6.0.  Total cholesterol 112, triglycerides 43, HDL 53, LDL 50.  Lipoprotein a is pending    Lipid panel December 21, 2024: Total cholesterol 93, triglycerides 35, HDL 40, LDL 46.    Laboratory data November 12, 2024 showed hemoglobin A1c of 6.3 in PCPs office.    Assessment :  1. Coronary artery disease involving native coronary artery of native heart without angina pectoris  JARDIANCE 10 MG Tab tablet    PLAVIX 75 MG Tab      2. Old myocardial infarction  PLAVIX 75 MG Tab      3. Ischemic heart disease due to coronary artery obstruction (HCC)        4. Essential hypertension  lisinopril (PRINIVIL) 2.5 MG Tab    TOPROL XL 25 MG TABLET SR 24 HR    CBC WITHOUT DIFFERENTIAL      5. Dyslipidemia  Lipid Profile    Comp Metabolic Panel      6. Type 2 diabetes mellitus with other circulatory complication, without long-term current use of insulin ([facility])  JARDIANCE 10 MG Tab tablet    HEMOGLOBIN A1C    MICROALBUMIN CREAT RATIO URINE             Plan:  I will continue the patient's current medications. We discussed low-salt, low-fat, diabetic diet.  We discussed exercise. I instructed him to continue monitoring his blood pressure.  He will return to clinic in approximately 6 months or sooner if he has any problems.  I will obtain a CBC, fasting lipid panel, CMP, hemoglobin A1c, and urine microalbumin to creatinine ratio prior to that clinic visit.    Thank you for allowing me to participate in this patient's care.  Please do not hesitate to contact me with questions or concerns.    Gali Cruz MD, [facility]  Cardiologist   [facility]    Please note that this dictation was created using voice recognition software. I have made every  reasonable attempt to correct obvious errors, but it is possible there are errors of grammar and possibly content that I did not discover before finalizing the note.

## 2025-04-28 ENCOUNTER — OFFICE VISIT (OUTPATIENT)
Dept: INTERNAL MEDICINE | Facility: OTHER | Age: 50
End: 2025-04-28
Payer: COMMERCIAL

## 2025-04-28 VITALS
HEIGHT: 68 IN | WEIGHT: 162.4 LBS | OXYGEN SATURATION: 98 % | SYSTOLIC BLOOD PRESSURE: 116 MMHG | TEMPERATURE: 97.3 F | DIASTOLIC BLOOD PRESSURE: 68 MMHG | HEART RATE: 58 BPM | BODY MASS INDEX: 24.61 KG/M2

## 2025-04-28 DIAGNOSIS — I25.2 OLD MYOCARDIAL INFARCTION: ICD-10-CM

## 2025-04-28 DIAGNOSIS — R73.03 PREDIABETES: ICD-10-CM

## 2025-04-28 DIAGNOSIS — I10 ESSENTIAL HYPERTENSION: ICD-10-CM

## 2025-04-28 DIAGNOSIS — I25.10 CORONARY ARTERY DISEASE INVOLVING NATIVE CORONARY ARTERY OF NATIVE HEART WITHOUT ANGINA PECTORIS: ICD-10-CM

## 2025-04-28 DIAGNOSIS — E78.5 DYSLIPIDEMIA: ICD-10-CM

## 2025-04-28 RX ORDER — CLOPIDOGREL BISULFATE 75 MG
75 TABLET ORAL DAILY
Qty: 100 TABLET | Refills: 3 | Status: SHIPPED | OUTPATIENT
Start: 2025-04-28

## 2025-04-28 RX ORDER — ATORVASTATIN CALCIUM 80 MG/1
80 TABLET, FILM COATED ORAL NIGHTLY
Qty: 100 TABLET | Refills: 2 | Status: SHIPPED | OUTPATIENT
Start: 2025-04-28

## 2025-04-28 ASSESSMENT — FIBROSIS 4 INDEX: FIB4 SCORE: 1

## 2025-04-28 NOTE — LETTER
To Whom It May Concern:         This is confirmation that Abdulaziz Shaffer attended his scheduled appointment with Lakia Lanza M.D. on 4/28/2025. I hereby certify that patient can work without restrictions.          If you have any questions please do not hesitate to call me at the phone number listed below.    Sincerely,          Lakia Lanza M.D.  [phone]

## 2025-04-28 NOTE — PATIENT INSTRUCTIONS
Thank you for coming today! Keep up the good work!!   Please remember a balanced lifestyle helps to keep your heart healthy.   Exercise for 30 minutes, at least 5 times a week or 1 hour 3 times a week.   Continue avoiding fatty, processed, sweetened food.

## 2025-05-04 DIAGNOSIS — R19.5 POSITIVE COLORECTAL CANCER SCREENING USING COLOGUARD TEST: ICD-10-CM

## 2025-05-04 LAB — NONINV COLON CA DNA+OCC BLD SCRN STL QL: POSITIVE

## 2025-05-06 ENCOUNTER — TELEPHONE (OUTPATIENT)
Dept: CARDIOLOGY | Facility: MEDICAL CENTER | Age: 50
End: 2025-05-06
Payer: COMMERCIAL

## 2025-05-06 DIAGNOSIS — I10 ESSENTIAL HYPERTENSION: ICD-10-CM

## 2025-05-06 RX ORDER — LISINOPRIL 2.5 MG/1
2.5 TABLET ORAL DAILY
Qty: 100 TABLET | Refills: 3 | Status: CANCELLED | OUTPATIENT
Start: 2025-05-06

## 2025-05-06 NOTE — TELEPHONE ENCOUNTER
SUADI  Received request via: Patient    Was the patient seen in the last year in this department? Yes    Does the patient have an active prescription (recently filled or refills available) for medication(s) requested?  YES    [facility]  [address]     Does the patient have snf Plus and need 100-day supply? (This applies to ALL medications) Patient does not have SCP

## 2025-05-07 ENCOUNTER — PATIENT MESSAGE (OUTPATIENT)
Dept: CARDIOLOGY | Facility: MEDICAL CENTER | Age: 50
End: 2025-05-07
Payer: COMMERCIAL

## 2025-05-07 DIAGNOSIS — I10 ESSENTIAL HYPERTENSION: ICD-10-CM

## 2025-05-07 RX ORDER — LISINOPRIL 2.5 MG/1
2.5 TABLET ORAL DAILY
Qty: 100 TABLET | Refills: 3 | Status: SHIPPED | OUTPATIENT
Start: 2025-05-07

## 2025-05-07 NOTE — TELEPHONE ENCOUNTER
MIAN    Caller: Abdulaziz Shaffer Jr.    Topic/issue: [facility] states that [facility] and he did not pick it up in time so medication was put back on shelf. [facility] says that [facility] needs provider approval before filling it again - patient is requesting we contact them directly to get this resolved.     Callback Number: [phone]    Thank you,  Chichi BUCK

## 2025-05-07 NOTE — TELEPHONE ENCOUNTER
Patient left voicemail. Would like medication refill on lisinopril.     Patient was sent a mychart to confirm this is the correct prescription.

## 2025-05-07 NOTE — TELEPHONE ENCOUNTER
Received request via: Patient    Was the patient seen in the last year in this department? Yes    Does the patient have an active prescription (recently filled or refills available) for medication(s) requested? No    [facility]    Does the patient have snf Plus and need 100-day supply? (This applies to ALL medications) Patient does not have SCP

## 2025-05-07 NOTE — TELEPHONE ENCOUNTER
Mycahrt sent to pt. Pt needs to contact pharmacy again. Pt was issued with 100 tabs with 3 refills.

## 2025-05-09 NOTE — Clinical Note
REFERRAL APPROVAL NOTICE         Sent on May 9, 2025                   Abdulaziz Buenosari Cali  [address] [address]                   Dear Mr. Shaffer,    After a careful review of the medical information and benefit coverage, [facility] has processed your referral. See below for additional details.    If applicable, you must be actively enrolled with your insurance for coverage of the authorized service. If you have any questions regarding your coverage, please contact your insurance directly.    REFERRAL INFORMATION   Referral #:  [id number]  Referred-To Provider    Referred-By Provider:  Gastroenterology    Lakia Lanza M.D.   [facility]      [address] [id number] [phone] [address] [address]  [phone]    Referral Start Date:  05/04/2025  Referral End Date:   05/04/2026             SCHEDULING  If you do not already have an appointment, please call [phone] to make an appointment.     MORE INFORMATION  If you do not already have a [employer] account, sign up at: [email or web address]  You can access your medical information, make appointments, see lab results, billing information, and more.  If you have questions regarding this referral, please contact  the [facility] Referrals department at:             [phone]. Monday - Friday 8:00AM - 5:00PM.     Sincerely,    [facility]

## 2025-05-24 NOTE — ED NOTES
Pt. Able to do po challenge, denies nausea at this time   Patient requests all Lab, Cardiology, and Radiology Results on their Discharge Instructions

## 2025-05-27 ENCOUNTER — TELEPHONE (OUTPATIENT)
Dept: CARDIOLOGY | Facility: MEDICAL CENTER | Age: 50
End: 2025-05-27
Payer: COMMERCIAL

## 2025-05-27 DIAGNOSIS — I25.2 OLD MYOCARDIAL INFARCTION: ICD-10-CM

## 2025-05-27 DIAGNOSIS — I25.10 CORONARY ARTERY DISEASE INVOLVING NATIVE CORONARY ARTERY OF NATIVE HEART WITHOUT ANGINA PECTORIS: ICD-10-CM

## 2025-05-27 NOTE — TELEPHONE ENCOUNTER
Received request via: Patient    Was the patient seen in the last year in this department? Yes    Does the patient have an active prescription (recently filled or refills available) for medication(s) requested? Yes. Refill request has been refused in Epic. Contacted pharmacy and called in most recent prescription.    [facility] [address]     Does the patient have custodial Plus and need 100-day supply? (This applies to ALL medications) Patient does not have SCP

## 2025-05-28 RX ORDER — CLOPIDOGREL BISULFATE 75 MG
75 TABLET ORAL DAILY
Qty: 90 TABLET | Refills: 1 | Status: SHIPPED | OUTPATIENT
Start: 2025-05-28 | End: 2025-05-30 | Stop reason: SDUPTHER

## 2025-05-29 NOTE — TELEPHONE ENCOUNTER
MIAN    Caller: Abdulaziz Shaffer Jr.     Topic/issue: Patient is calling because the [facility] pharmacy says the refill for the   PLAVIX 75 MG Tab  is missing additional information.    Please advise.    Callback Number: [phone]     Thank you,  Danae RIVERA

## 2025-05-30 ENCOUNTER — PATIENT MESSAGE (OUTPATIENT)
Dept: CARDIOLOGY | Facility: MEDICAL CENTER | Age: 50
End: 2025-05-30
Payer: COMMERCIAL

## 2025-05-30 DIAGNOSIS — I25.10 CORONARY ARTERY DISEASE INVOLVING NATIVE CORONARY ARTERY OF NATIVE HEART WITHOUT ANGINA PECTORIS: ICD-10-CM

## 2025-05-30 DIAGNOSIS — I25.2 OLD MYOCARDIAL INFARCTION: ICD-10-CM

## 2025-05-30 RX ORDER — CLOPIDOGREL BISULFATE 75 MG/1
75 TABLET ORAL DAILY
Qty: 90 TABLET | Refills: 1 | Status: SHIPPED | OUTPATIENT
Start: 2025-05-30

## 2025-05-30 NOTE — TELEPHONE ENCOUNTER
Spoke with pharmacy, the medication will need a prior authorization due to the medication being requested as dispense as written, per chart review, medication has historically been ordered as LUCIO. Left message for patient to return call to discuss if appropriate to order clopidogrel rather than the name brand to avoid the prior authorization process.

## 2025-05-30 NOTE — TELEPHONE ENCOUNTER
SUADI    Caller: Abdulaziz Shaffer Jr.    Topic/issue: Patient is out of medication. [facility] told him they are unable to fill this medication due to it missing information (patient is unsure what exactly it is missing). Is wondering if we can call [facility] to get it sorted out as patient needs to fill it today.    Callback Number: [phone]    Thank you,  Chichi BUCK

## 2025-08-14 ENCOUNTER — TELEPHONE (OUTPATIENT)
Dept: CARDIOLOGY | Facility: MEDICAL CENTER | Age: 50
End: 2025-08-14
Payer: COMMERCIAL

## 2025-08-29 ENCOUNTER — OFFICE VISIT (OUTPATIENT)
Dept: INTERNAL MEDICINE | Facility: OTHER | Age: 50
End: 2025-08-29
Payer: COMMERCIAL

## 2025-08-29 VITALS
BODY MASS INDEX: 27.45 KG/M2 | DIASTOLIC BLOOD PRESSURE: 79 MMHG | SYSTOLIC BLOOD PRESSURE: 142 MMHG | HEART RATE: 54 BPM | TEMPERATURE: 96.6 F | OXYGEN SATURATION: 97 % | HEIGHT: 66 IN | WEIGHT: 170.8 LBS

## 2025-08-29 DIAGNOSIS — R73.03 PREDIABETES: Primary | ICD-10-CM

## 2025-08-29 DIAGNOSIS — I25.2 OLD MYOCARDIAL INFARCTION: ICD-10-CM

## 2025-08-29 DIAGNOSIS — E78.5 DYSLIPIDEMIA: ICD-10-CM

## 2025-08-29 DIAGNOSIS — I10 ESSENTIAL HYPERTENSION: ICD-10-CM

## 2025-08-29 DIAGNOSIS — R19.5 POSITIVE COLORECTAL CANCER SCREENING USING COLOGUARD TEST: ICD-10-CM

## 2025-08-29 DIAGNOSIS — I25.10 CORONARY ARTERY DISEASE INVOLVING NATIVE CORONARY ARTERY OF NATIVE HEART WITHOUT ANGINA PECTORIS: ICD-10-CM

## 2025-08-29 PROBLEM — I22.0: Status: RESOLVED | Noted: 2024-12-10 | Resolved: 2025-08-29

## 2025-08-29 RX ORDER — CLOPIDOGREL BISULFATE 75 MG/1
75 TABLET ORAL DAILY
Qty: 90 TABLET | Refills: 1 | Status: SHIPPED | OUTPATIENT
Start: 2025-08-29

## 2025-08-29 ASSESSMENT — ENCOUNTER SYMPTOMS
HEARTBURN: 0
NERVOUS/ANXIOUS: 0
PHOTOPHOBIA: 0
COUGH: 0
CONSTIPATION: 0
WHEEZING: 0
VOMITING: 0
DIARRHEA: 0
EYE PAIN: 0
NAUSEA: 0
DIAPHORESIS: 0
CHILLS: 0
DEPRESSION: 0
WEAKNESS: 0
HEADACHES: 0
BLURRED VISION: 0
BLOOD IN STOOL: 1
FEVER: 0
DIZZINESS: 0
SHORTNESS OF BREATH: 0
ABDOMINAL PAIN: 0
PALPITATIONS: 0
INSOMNIA: 0
SORE THROAT: 0

## 2025-08-29 ASSESSMENT — LIFESTYLE VARIABLES
SKIP TO QUESTIONS 9-10: 1
HOW OFTEN DO YOU HAVE SIX OR MORE DRINKS ON ONE OCCASION: NEVER
HOW OFTEN DO YOU HAVE A DRINK CONTAINING ALCOHOL: NEVER
AUDIT-C TOTAL SCORE: 0
HOW MANY STANDARD DRINKS CONTAINING ALCOHOL DO YOU HAVE ON A TYPICAL DAY: PATIENT DOES NOT DRINK

## 2025-08-29 ASSESSMENT — FIBROSIS 4 INDEX: FIB4 SCORE: 1.02
